# Patient Record
Sex: FEMALE | Race: WHITE | Employment: FULL TIME | URBAN - METROPOLITAN AREA
[De-identification: names, ages, dates, MRNs, and addresses within clinical notes are randomized per-mention and may not be internally consistent; named-entity substitution may affect disease eponyms.]

---

## 2017-07-12 ENCOUNTER — OFFICE VISIT (OUTPATIENT)
Dept: INTERNAL MEDICINE CLINIC | Age: 32
End: 2017-07-12

## 2017-07-12 VITALS
SYSTOLIC BLOOD PRESSURE: 114 MMHG | HEIGHT: 65 IN | RESPIRATION RATE: 16 BRPM | WEIGHT: 125 LBS | DIASTOLIC BLOOD PRESSURE: 66 MMHG | HEART RATE: 76 BPM | BODY MASS INDEX: 20.83 KG/M2 | TEMPERATURE: 98.2 F | OXYGEN SATURATION: 98 %

## 2017-07-12 DIAGNOSIS — D22.9 NUMEROUS MOLES: ICD-10-CM

## 2017-07-12 DIAGNOSIS — Z23 NEED FOR TDAP VACCINATION: ICD-10-CM

## 2017-07-12 DIAGNOSIS — Z00.00 WELL WOMAN EXAM (NO GYNECOLOGICAL EXAM): Primary | ICD-10-CM

## 2017-07-12 RX ORDER — NORETHINDRONE ACETATE AND ETHINYL ESTRADIOL AND FERROUS FUMARATE 1MG-20(21)
1 KIT ORAL DAILY
COMMUNITY
Start: 2017-07-05 | End: 2020-01-08

## 2017-07-12 NOTE — PROGRESS NOTES
HISTORY OF PRESENT ILLNESS  Lee Nichols is a 36 y.o. female. HPI  New Patient  Lee Nichols is a new patient. Prior care:  She has not had a PCP. Has been seeing her gynecologist for primary care. Her gynecologist  Dr. Trinity Flores. Records have been requested. ER Visits/ Hospitalizations [id number]: None     Health Maintenance  Breast Cancer screening: Aunt in remission  Colorectal Cancer screening: Not indicated. Cervical Cancer screening: Up to date, completed by gynecologist.    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 RVA x 1 yr   Review of Systems   Constitutional: Negative for chills, fever and weight loss. HENT: Negative for congestion and sore throat. Eyes: Negative for blurred vision and double vision. Respiratory: Negative for cough and shortness of breath. Cardiovascular: Negative for chest pain, orthopnea and leg swelling. Gastrointestinal: Negative for abdominal pain, blood in stool, constipation, diarrhea, heartburn, nausea and vomiting. Genitourinary: Negative for frequency and urgency. Musculoskeletal: Negative for joint pain and myalgias. Neurological: Negative for dizziness, tremors, weakness and headaches. Endo/Heme/Allergies: Does not bruise/bleed easily. Psychiatric/Behavioral: Negative for depression and suicidal ideas. There are no active problems to display for this patient. Current Outpatient Prescriptions   Medication Sig Dispense Refill    JUNEL FE 1/20, 28, 1 mg-20 mcg (21)/75 mg (7) tab Take 1 Tab by mouth daily.          No Known Allergies   Visit Vitals    /66 (BP 1 Location: Right arm, BP Patient Position: Sitting)    Pulse 76    Temp 98.2 °F (36.8 °C) (Oral)    Resp 16    Ht 5' 4.57\" (1.64 m)    Wt 125 lb (56.7 kg)    SpO2 98%    BMI 21.08 kg/m2       Physical Exam   Constitutional: She is oriented to person, place, and time. She appears well-developed and well-nourished. HENT:   Right Ear: External ear normal.   Left Ear: External ear normal.   Mouth/Throat: Oropharynx is clear and moist. No oropharyngeal exudate. Eyes: Conjunctivae are normal. No scleral icterus. Neck: Normal range of motion. Neck supple. No thyromegaly present. Cardiovascular: Normal rate, regular rhythm and normal heart sounds. Exam reveals no gallop and no friction rub. No murmur heard. Pulmonary/Chest: Effort normal and breath sounds normal. No respiratory distress. She has no wheezes. She has no rales. She exhibits no tenderness. Abdominal: Soft. Bowel sounds are normal. She exhibits no distension. There is no tenderness. There is no rebound and no guarding. Genitourinary:   Genitourinary Comments: Up to date, completed by gynecologist.       Musculoskeletal: Normal range of motion. She exhibits no edema or tenderness. Neurological: She is alert and oriented to person, place, and time. Skin:        Psychiatric: She has a normal mood and affect. ASSESSMENT and PLAN  Jaspreet Hua was seen today for establish care. Diagnoses and all orders for this visit:    Well woman exam (no gynecological exam)- Health Maintenance reviewed - tetanus booster given. -     CBC WITH AUTOMATED DIFF  -     LIPID PANEL  -     METABOLIC PANEL, COMPREHENSIVE  -     THYROID PANEL  -     TSH 3RD GENERATION  -     VITAMIN D, 25 HYDROXY  -     TETANUS, DIPHTHERIA TOXOIDS AND ACELLULAR PERTUSSIS VACCINE (TDAP), IN INDIVIDS. >=7, IM  -     MA IMMUNIZ ADMIN,1 SINGLE/COMB VAC/TOXOID    Numerous moles  -     REFERRAL TO DERMATOLOGY    Need for Tdap vaccination  -     TETANUS, DIPHTHERIA TOXOIDS AND ACELLULAR PERTUSSIS VACCINE (TDAP), IN INDIVIDS. >=7, IM  -     MA IMMUNIZ ADMIN,1 SINGLE/COMB VAC/TOXOID      Follow-up Disposition:  Return in about 1 year (around 7/12/2018) for Physical - 30 minute appointment.     Medication risks/benefits/costs/interactions/alternatives discussed with patient. Kerline Alejo  was given an after visit summary which includes diagnoses, current medications, & vitals. she expressed understanding with the diagnosis and plan.

## 2017-07-12 NOTE — PATIENT INSTRUCTIONS
It was a pleasure to meet you! As discussed:    Health Maintenance   I have ordered your age appropriate labs please complete them. You will need to fast 10-12hrs before your appointment. Great job on [employer] and exercising! Remember goal for exercise is 150minutes of moderate exercise a week and diet goal is to eat 50% fruits and vegetables with minimal sugar, fat and oil daily. See health.gov or choosemyplate.gov for more details. Your cervical cancer and breast cancer screening will be completed by your ob/ gyn as scheduled. Mole evaluation  Please schedule an appointment with dermatology (list provided) to evaluate your moles. You received the tetanus/pertussis vaccine today. Please see vaccine handout for more information and let us know if you have any reactions or concerns after the vaccination. Well Visit, Ages 25 to 48: Care Instructions  Your Care Instructions  Physical exams can help you stay healthy. Your doctor has checked your overall health and may have suggested ways to take good care of yourself. He or she also may have recommended tests. At home, you can help prevent illness with healthy eating, regular exercise, and other steps. Follow-up care is a key part of your treatment and safety. Be sure to make and go to all appointments, and call your doctor if you are having problems. It's also a good idea to know your test results and keep a list of the medicines you take. How can you care for yourself at home? · Reach and stay at a healthy weight. This will lower your risk for many problems, such as obesity, diabetes, heart disease, and high blood pressure. · Get at least 30 minutes of physical activity on most days of the week. Walking is a good choice. You also may want to do other activities, such as running, swimming, cycling, or playing tennis or team sports. Discuss any changes in your exercise program with your doctor. · Do not smoke or allow others to smoke around you. If you need help quitting, talk to your doctor about stop-smoking programs and medicines. These can increase your chances of quitting for good. · Talk to your doctor about whether you have any risk factors for sexually transmitted infections (STIs). Having one sex partner (who does not have STIs and does not have sex with anyone else) is a good way to avoid these infections. · Use birth control if you do not want to have children at this time. Talk with your doctor about the choices available and what might be best for you. · Protect your skin from too much sun. When you're outdoors from 10 a.m. to 4 p.m., stay in the shade or cover up with clothing and a hat with a wide brim. Wear sunglasses that block UV rays. Even when it's cloudy, put broad-spectrum sunscreen (SPF 30 or higher) on any exposed skin. · See a dentist one or two times a year for checkups and to have your teeth cleaned. · Wear a seat belt in the car. · Drink alcohol in moderation, if at all. That means no more than 2 drinks a day for men and 1 drink a day for women. Follow your doctor's advice about when to have certain tests. These tests can spot problems early. For everyone  · Cholesterol. Have the fat (cholesterol) in your blood tested after age 21. Your doctor will tell you how often to have this done based on your age, family history, or other things that can increase your risk for heart disease. · Blood pressure. Have your blood pressure checked during a routine doctor visit. Your doctor will tell you how often to check your blood pressure based on your age, your blood pressure results, and other factors. · Vision. Talk with your doctor about how often to have a glaucoma test.  · Diabetes. Ask your doctor whether you should have tests for diabetes. · Colon cancer. Have a test for colon cancer at age 48. You may have one of several tests. If you are younger than 48, you may need a test earlier if you have any risk factors.  Risk factors include whether you already had a precancerous polyp removed from your colon or whether your parent, brother, sister, or child has had colon cancer. For women  · Breast exam and mammogram. Talk to your doctor about when you should have a clinical breast exam and a mammogram. Medical experts differ on whether and how often women under 50 should have these tests. Your doctor can help you decide what is right for you. · Pap test and pelvic exam. Begin Pap tests at age 24. A Pap test is the best way to find cervical cancer. The test often is part of a pelvic exam. Ask how often to have this test.  · Tests for sexually transmitted infections (STIs). Ask whether you should have tests for STIs. You may be at risk if you have sex with more than one person, especially if your partners do not wear condoms. For men  · Tests for sexually transmitted infections (STIs). Ask whether you should have tests for STIs. You may be at risk if you have sex with more than one person, especially if you do not wear a condom. · Testicular cancer exam. Ask your doctor whether you should check your testicles regularly. · Prostate exam. Talk to your doctor about whether you should have a blood test (called a PSA test) for prostate cancer. Experts differ on whether and when men should have this test. Some experts suggest it if you are older than 39 and are -American or have a father or brother who got prostate cancer when he was younger than 72. When should you call for help? Watch closely for changes in your health, and be sure to contact your doctor if you have any problems or symptoms that concern you. Where can you learn more? Go to http://sherlyn-ga.info/. Enter P072 in the search box to learn more about \"Well Visit, Ages 25 to 48: Care Instructions. \"  Current as of: July 19, 2016  Content Version: 11.3  © 1413-4866 [employer].  Care instructions adapted under license by [employer] (which disclaims liability or warranty for this information). If you have questions about a medical condition or this instruction, always ask your healthcare professional. [address] any warranty or liability for your use of this information.

## 2017-07-12 NOTE — MR AVS SNAPSHOT
Visit Information Date & Time Provider Department Dept. Phone Encounter #  
 7/12/2017  3:00 PM Ronit Palomares MD [address] [id number] Internal Medicine [phone] [id number] Follow-up Instructions Return in about 1 year (around 7/12/2018) for Physical - 30 minute appointment. Upcoming Health Maintenance Date Due DTaP/Tdap/Td series (1 - Tdap) 12/29/2008 PAP AKA CERVICAL CYTOLOGY 12/29/2008 INFLUENZA AGE 9 TO ADULT 8/1/2017 Allergies as of 7/12/2017  Review Complete On: 7/12/2017 By: Ronit Palomares MD  
 No Known Allergies Current Immunizations  Never Reviewed Name Date Tdap  Incomplete Not reviewed this visit You Were Diagnosed With   
  
 Codes Comments Well woman exam (no gynecological exam)    -  Primary ICD-10-CM: Z00.00 ICD-9-CM: V70.0 Numerous moles     ICD-10-CM: D22.9 ICD-9-CM: 216.9 Vitals BP Pulse Temp Resp Height(growth percentile) Weight(growth percentile) 114/66 (BP 1 Location: Right arm, BP Patient Position: Sitting) 76 98.2 °F (36.8 °C) (Oral) 16 5' 4.57\" (1.64 m) 125 lb (56.7 kg) LMP SpO2 BMI Smoking Status 06/26/2017 98% 21.08 kg/m2 Never Smoker Vitals History BMI and BSA Data Body Mass Index Body Surface Area 21.08 kg/m 2 1.61 m 2 Preferred Pharmacy Pharmacy Name Phone [id number] [place], [address] [facility] [phone] Your Updated Medication List  
  
   
This list is accurate as of: 7/12/17  3:49 PM.  Always use your most recent med list.  
  
  
  
  
 Nalini Sy FE 1/20 (28) 1 mg-20 mcg (21)/75 mg (7) Tab Generic drug:  norethindrone-ethinyl estradiol Take 1 Tab by mouth daily. We Performed the Following CBC WITH AUTOMATED DIFF [30837 CPT(R)] LIPID PANEL [14522 CPT(R)] METABOLIC PANEL, COMPREHENSIVE [91980 CPT(R)] [place] IMMUNIZ ADMIN,1 SINGLE/COMB VAC/TOXOID [id number] CPT(R)] REFERRAL TO DERMATOLOGY [REF19 Custom] TETANUS, DIPHTHERIA TOXOIDS AND ACELLULAR PERTUSSIS VACCINE (TDAP), IN INDIVIDS. >=7, IM [id number] CPT(R)] THYROID PANEL [id number] CPT(R)] TSH 3RD GENERATION [13774 CPT(R)] VITAMIN D, 25 HYDROXY [id number] CPT(R)] Follow-up Instructions Return in about 1 year (around 7/12/2018) for Physical - 30 minute appointment. Referral Information Referral ID Referred By Referred To  
  
 [id number] ROSIE TAFOYA MD   
   [address] [facility] [address] Phone: [phone] Fax: [phone] Visits Status Start Date End Date 1 New Request 7/12/17 7/12/18 If your referral has a status of pending review or denied, additional information will be sent to support the outcome of this decision. Patient Instructions It was a pleasure to meet you! As discussed: 
 
Health Maintenance I have ordered your age appropriate labs please complete them. You will need to fast 10-12hrs before your appointment. Great job on [employer] and exercising! Remember goal for exercise is 150minutes of moderate exercise a week and diet goal is to eat 50% fruits and vegetables with minimal sugar, fat and oil daily. See health.gov or choosemyplate.gov for more details. Your cervical cancer and breast cancer screening will be completed by your ob/ gyn as scheduled. Mole evaluation Please schedule an appointment with dermatology (list provided) to evaluate your moles. Well Visit, Ages 25 to 48: Care Instructions Your Care Instructions Physical exams can help you stay healthy. Your doctor has checked your overall health and may have suggested ways to take good care of yourself. He or she also may have recommended tests. At home, you can help prevent illness with healthy eating, regular exercise, and other steps. Follow-up care is a key part of your treatment and safety. Be sure to make and go to all appointments, and call your doctor if you are having problems. It's also a good idea to know your test results and keep a list of the medicines you take. How can you care for yourself at home? · Reach and stay at a healthy weight. This will lower your risk for many problems, such as obesity, diabetes, heart disease, and high blood pressure. · Get at least 30 minutes of physical activity on most days of the week. Walking is a good choice. You also may want to do other activities, such as running, swimming, cycling, or playing tennis or team sports. Discuss any changes in your exercise program with your doctor. · Do not smoke or allow others to smoke around you. If you need help quitting, talk to your doctor about stop-smoking programs and medicines. These can increase your chances of quitting for good. · Talk to your doctor about whether you have any risk factors for sexually transmitted infections (STIs). Having one sex partner (who does not have STIs and does not have sex with anyone else) is a good way to avoid these infections. · Use birth control if you do not want to have children at this time. Talk with your doctor about the choices available and what might be best for you. · Protect your skin from too much sun. When you're outdoors from 10 a.m. to 4 p.m., stay in the shade or cover up with clothing and a hat with a wide brim. Wear sunglasses that block UV rays. Even when it's cloudy, put broad-spectrum sunscreen (SPF 30 or higher) on any exposed skin. · See a dentist one or two times a year for checkups and to have your teeth cleaned. · Wear a seat belt in the car. · Drink alcohol in moderation, if at all. That means no more than 2 drinks a day for men and 1 drink a day for women. Follow your doctor's advice about when to have certain tests. These tests can spot problems early. For everyone · Cholesterol. Have the fat (cholesterol) in your blood tested after age 21. Your doctor will tell you how often to have this done based on your age, family history, or other things that can increase your risk for heart disease. · Blood pressure. Have your blood pressure checked during a routine doctor visit. Your doctor will tell you how often to check your blood pressure based on your age, your blood pressure results, and other factors. · Vision. Talk with your doctor about how often to have a glaucoma test. 
· Diabetes. Ask your doctor whether you should have tests for diabetes. · Colon cancer. Have a test for colon cancer at age 48. You may have one of several tests. If you are younger than 48, you may need a test earlier if you have any risk factors. Risk factors include whether you already had a precancerous polyp removed from your colon or whether your parent, brother, sister, or child has had colon cancer. For women · Breast exam and mammogram. Talk to your doctor about when you should have a clinical breast exam and a mammogram. Medical experts differ on whether and how often women under 50 should have these tests. Your doctor can help you decide what is right for you. · Pap test and pelvic exam. Begin Pap tests at age 24. A Pap test is the best way to find cervical cancer. The test often is part of a pelvic exam. Ask how often to have this test. 
· Tests for sexually transmitted infections (STIs). Ask whether you should have tests for STIs. You may be at risk if you have sex with more than one person, especially if your partners do not wear condoms. For men · Tests for sexually transmitted infections (STIs). Ask whether you should have tests for STIs. You may be at risk if you have sex with more than one person, especially if you do not wear a condom. · Testicular cancer exam. Ask your doctor whether you should check your testicles regularly. · Prostate exam. Talk to your doctor about whether you should have a blood test (called a PSA test) for prostate cancer. Experts differ on whether and when men should have this test. Some experts suggest it if you are older than 39 and are -American or have a father or brother who got prostate cancer when he was younger than 72. When should you call for help? Watch closely for changes in your health, and be sure to contact your doctor if you have any problems or symptoms that concern you. Where can you learn more? Go to http://sherlyn-ga.info/. Enter P072 in the search box to learn more about \"Well Visit, Ages 25 to 48: Care Instructions. \" Current as of: July 19, 2016 Content Version: 11.3 © 8969-6131 [employer]. Care instructions adapted under license by [employer] (which disclaims liability or warranty for this information). If you have questions about a medical condition or this instruction, always ask your healthcare professional. [place] [address] any warranty or liability for your use of this information. Introducing [facility]! Isa Schmidt introduces [employer] patient portal. Now you can access parts of your medical record, email your doctor's office, and request medication refills online. 1. In your internet browser, go to [email or web address] 2. Click on the First Time User? Click Here link in the Sign In box. You will see the New Member Sign Up page. 3. Enter your [employer] Access Code exactly as it appears below. You will not need to use this code after youve completed the sign-up process. If you do not sign up before the expiration date, you must request a new code. · [employer] Access Code: [email or web address] Expires: 10/10/2017  3:04 PM 
 
4. Enter the last four digits of your Social Security Number (xxxx) and Date of Birth (mm/dd/yyyy) as indicated and click Submit.  You will be taken to the next sign-up page. 5. Create a Best[employer]endor ID. This will be your [employer] login ID and cannot be changed, so think of one that is secure and easy to remember. 6. Create a [employer] password. You can change your password at any time. 7. Enter your Password Reset Question and Answer. This can be used at a later time if you forget your password. 8. Enter your e-mail address. You will receive e-mail notification when new information is available in [address] 9. Click Sign Up. You can now view and download portions of your medical record. 10. Click the Download Summary menu link to download a portable copy of your medical information. If you have questions, please visit the Frequently Asked Questions section of the [employer] website. Remember, [employer] is NOT to be used for urgent needs. For medical emergencies, dial 911. Now available from your iPhone and Android! Please provide this summary of care documentation to your next provider. Your primary care clinician is listed as Ruby Gilbert. If you have any questions after today's visit, please call [phone].

## 2017-07-12 NOTE — PROGRESS NOTES
Chief Complaint   Patient presents with   Pauline Orona [facility]     1. Have you been to the ER, urgent care clinic since your last visit? Hospitalized since your last visit? Yes When: 2 weeks ago [facility] Reason for visit: ear infecton    2. Have you seen or consulted any other health care providers outside of the [address] [place] [address] since your last visit? Include any pap smears or colon screening.  No

## 2018-02-16 ENCOUNTER — OFFICE VISIT (OUTPATIENT)
Dept: INTERNAL MEDICINE CLINIC | Age: 33
End: 2018-02-16

## 2018-02-16 VITALS
HEIGHT: 65 IN | SYSTOLIC BLOOD PRESSURE: 108 MMHG | RESPIRATION RATE: 17 BRPM | TEMPERATURE: 100.1 F | HEART RATE: 117 BPM | DIASTOLIC BLOOD PRESSURE: 60 MMHG | OXYGEN SATURATION: 99 % | BODY MASS INDEX: 21.16 KG/M2 | WEIGHT: 127 LBS

## 2018-02-16 DIAGNOSIS — R68.89 FLU-LIKE SYMPTOMS: Primary | ICD-10-CM

## 2018-02-16 DIAGNOSIS — J11.1 INFLUENZA: ICD-10-CM

## 2018-02-16 LAB
FLUAV+FLUBV AG NOSE QL IA.RAPID: NEGATIVE POS/NEG
FLUAV+FLUBV AG NOSE QL IA.RAPID: NEGATIVE POS/NEG
VALID INTERNAL CONTROL?: YES

## 2018-02-16 RX ORDER — OSELTAMIVIR PHOSPHATE 75 MG/1
75 CAPSULE ORAL 2 TIMES DAILY
Qty: 10 CAP | Refills: 0 | Status: SHIPPED | OUTPATIENT
Start: 2018-02-16 | End: 2018-02-21

## 2018-02-16 NOTE — PROGRESS NOTES
Eric Briceño is a 29 y.o. female who present complaining of flu-like symptoms: fevers, chills, myalgias, congestion, sore throat and cough since last night. Denies dyspnea or wheezing.

## 2018-02-16 NOTE — PROGRESS NOTES
Subjective:   Cornelio Marie is a 29 y.o. female who present complaining of flu-like symptoms: fevers, chills, myalgias, congestion, sore throat and cough for 1 days. She denies dyspnea or wheezing. Smoking status: non-smoker. Flu vaccine status: not vaccinated this season. Relevant PMH: No pertinent additional PMH. Review of Systems  A comprehensive review of systems was negative except for that written in the HPI. There are no active problems to display for this patient. Current Outpatient Prescriptions   Medication Sig Dispense Refill    oseltamivir (TAMIFLU) 75 mg capsule Take 1 Cap by mouth two (2) times a day for 5 days. 10 Cap 0    JUNEL FE 1/20, 28, 1 mg-20 mcg (21)/75 mg (7) tab Take 1 Tab by mouth daily. Objective:     Visit Vitals    /60 (BP 1 Location: Right arm, BP Patient Position: Sitting)    Pulse (!) 117    Temp (!) 101 °F  (Oral)    Resp 17    Ht 5' 4.57\" (1.64 m)    Wt 127 lb (57.6 kg)    LMP 02/09/2018    SpO2 99%    BMI 21.42 kg/m2       Appears moderately ill but not toxic; temperature as noted in vitals. Ears normal.   Throat and pharynx normal.    Neck supple. No adenopathy in the neck. Sinuses non tender. The chest is clear. Rapid flu is positive    Assessment/Plan:   Influenza very likely from clinical presentation and seasonal pattern  Considerations for specific influenza anti-viral therapy: Positive rapid flu in the office  Symptomatic therapy suggested: rest, increase fluids and call prn if symptoms persist or worsen. Call or return to clinic prn if these symptoms worsen or fail to improve as anticipated. Diagnoses and all orders for this visit:    1. Flu-like symptoms  -     AMB POC CORBIN INFLUENZA A/B TEST    2. Influenza  -     oseltamivir (TAMIFLU) 75 mg capsule; Take 1 Cap by mouth two (2) times a day for 5 days.

## 2018-06-15 ENCOUNTER — OFFICE VISIT (OUTPATIENT)
Dept: INTERNAL MEDICINE CLINIC | Age: 33
End: 2018-06-15

## 2018-06-15 VITALS
TEMPERATURE: 97.7 F | WEIGHT: 128 LBS | HEART RATE: 60 BPM | OXYGEN SATURATION: 99 % | DIASTOLIC BLOOD PRESSURE: 54 MMHG | SYSTOLIC BLOOD PRESSURE: 96 MMHG | HEIGHT: 65 IN | RESPIRATION RATE: 12 BRPM | BODY MASS INDEX: 21.33 KG/M2

## 2018-06-15 DIAGNOSIS — B36.0 TINEA VERSICOLOR: Primary | ICD-10-CM

## 2018-06-15 NOTE — PROGRESS NOTES
Arik Ahumada is a 29 y.o. female who was seen in clinic today (6/15/2018). Assessment & Plan:   Diagnoses and all orders for this visit:    1. Tinea versicolor- this is a recurrent problem, it is a new problem to me, symptoms are: worsened, differential dx reviewed with the patient, looks classic. Will treat with: meds below. See AVS, expected time course for resolution reviewed. -     Selenium Sulfide 2.25 % topical foam; Apply  to affected area daily. Follow-up Disposition:  Return if symptoms worsen or fail to improve. Subjective:   Pasquale Mota was seen today for Skin Problem    Dermatology Review  She is here to talk about rash. She noticed it 1 month ago, with gradually worsening since that time. Location: neck and chest.  Symptoms include erythema. She reports slotchy and dry after shower. It is worse in robert head. She has had this before. She used Selenium sulfide in the past w/ good relief. Treatment to date has included nothing currently. Prior to Admission medications    Medication Sig Start Date End Date Taking? Authorizing Provider   JUNEL FE 1/20, 30, 1 mg-20 mcg (21)/75 mg (7) tab Take 1 Tab by mouth daily. 7/5/17  Yes Historical Provider          No Known Allergies        Review of Systems   Constitutional: Negative for chills and fever. HENT: Negative for congestion. Respiratory: Negative for cough and shortness of breath. Cardiovascular: Negative for chest pain. Skin: Positive for rash. Objective:   Physical Exam   Cardiovascular: Normal heart sounds. Bradycardia present. No murmur heard. Pulmonary/Chest: Effort normal and breath sounds normal. She has no wheezes. She has no rales. Skin: Rash noted. Neck and upper chest white hypopigmented areas and some papular pink areas.          Visit Vitals    BP 96/54    Pulse 60    Temp 97.7 °F (36.5 °C) (Oral)    Resp 12    Ht 5' 4.57\" (1.64 m)    Wt 128 lb (58.1 kg)    LMP 06/13/2018    SpO2 99%    BMI 21.58 kg/m2         Disclaimer:  Advised her to call back or return to office if symptoms worsen/change/persist.  Discussed expected course/resolution/complications of diagnosis in detail with patient. Medication risks/benefits/costs/interactions/alternatives discussed with patient. She was given an after visit summary which includes diagnoses, current medications, & vitals. She expressed understanding with the diagnosis and plan. Aspects of this note may have been generated using voice recognition software. Despite editing, there may be some syntax errors.        Kenji Morris MD

## 2018-06-15 NOTE — MR AVS SNAPSHOT
[address] [facility] [address] [facility] 
[phone] Patient: Sierra Merlin MRN: [id number] :1985 Visit Information Date & Time Provider Department Dept. Phone Encounter #  
 6/15/2018 12:30 PM Wendy Brantley, [address] [facility] Internal Medicine [phone] [id number] Follow-up Instructions Return if symptoms worsen or fail to improve. Your Appointments 2018  9:00 AM  
PHYSICAL with Evaristo Albright MD  
[facility] Internal Medicine [facility]) Appt Note: CPE - (1yr) [address] [facility] [address] [facility] [address]  
[phone] [address] [place] [place] [address] Upcoming Health Maintenance Date Due Influenza Age 5 to Adult 2018 PAP AKA CERVICAL CYTOLOGY 7/3/2020 DTaP/Tdap/Td series (2 - Td) 2027 Allergies as of 6/15/2018  Review Complete On: 6/15/2018 By: Wendy Brantley MD  
 No Known Allergies Current Immunizations  Reviewed on 6/15/2018 Name Date Tdap 2017 Reviewed by Sylvester Gallegos RN on 6/15/2018 at 12:28 PM  
You Were Diagnosed With   
  
 Codes Comments Tinea versicolor    -  Primary ICD-10-CM: B36.0 ICD-9-CM: 111.0 Vitals BP Pulse Temp Resp Height(growth percentile) Weight(growth percentile) 96/54 60 97.7 °F (36.5 °C) (Oral) 12 5' 4.57\" (1.64 m) 128 lb (58.1 kg) LMP SpO2 BMI OB Status Smoking Status 2018 99% 21.58 kg/m2 Having regular periods Never Smoker BMI and BSA Data Body Mass Index Body Surface Area  
 21.58 kg/m 2 1.63 m 2 Preferred Pharmacy Pharmacy Name Phone [id number] [address] [phone] Your Updated Medication List  
  
   
This list is accurate as of 6/15/18 12:40 PM.  Always use your most recent med list.  
  
  
  
  
 Eric Yvette FE  (30) 1 mg-20 mcg (21)/75 mg (7) Tab Generic drug:  norethindrone-ethinyl estradiol Take 1 Tab by mouth daily. Selenium Sulfide 2.25 % topical foam  
Apply  to affected area daily. Prescriptions Sent to Pharmacy Refills Selenium Sulfide 2.25 % topical foam 0 Sig: Apply  to affected area daily. Class: Normal  
 Pharmacy: [id number] [place], [address] [facility] #: [phone] Route: Topical  
  
Follow-up Instructions Return if symptoms worsen or fail to improve. Patient Instructions Tinea Versicolor: Care Instructions Your Care Instructions Tinea versicolor is a skin infection caused by a yeast (fungus). It causes many small spots, usually on the chest and back. The spotted skin can be flaky or scaly. The spots do not tan in the sun, so they are lighter than the skin around them. Some spots may be darker than the skin around them. The yeast that causes tinea versicolor normally lives on your skin. But it becomes a problem only when warmth and humidity allow the yeast to grow rapidly and increase in number. Some people are more likely to get tinea versicolor. It does not spread from person to person. Tinea versicolor usually gets better as you age. You can treat tinea versicolor with cream or ointment that kills the yeast. You may need pills to kill the fungus if the spots cover a lot of your body. Although treatment kills the yeast quickly, your skin may not return to normal for months after treatment. You can get this condition again after treatment. Follow-up care is a key part of your treatment and safety. Be sure to make and go to all appointments, and call your doctor if you are having problems. It's also a good idea to know your test results and keep a list of the medicines you take. How can you care for yourself at home? · Follow the directions for use of creams, shampoos, or solutions. You will probably need to use them for 1 to 2 weeks.  If your skin gets irritated, stop using the product, and call your doctor. · To prevent tinea versicolor, use a cream, shampoo, or solution one time a month. Your doctor may prescribe pills to prevent the spots from returning. · Dry off well after bathing. Keep your skin clean and dry. · Always wear sunscreen on exposed skin. Make sure to use a broad-spectrum sunscreen that has a sun protection factor (SPF) of 30 or higher. Use it every day, even when it is cloudy. · If you keep getting tinea versicolor, wash your clothes in very hot water to kill the yeast. 
When should you call for help? Call your doctor now or seek immediate medical care if: 
? · You have signs of infection such as: 
¨ Pain, warmth, or swelling in your skin. ¨ Red streaks near a wound in your skin. ¨ Pus coming from a wound in your skin. ¨ A fever. ? Watch closely for changes in your health, and be sure to contact your doctor if: 
? · Your skin condition does not improve in 2 weeks. ? · You do not get better as expected. Where can you learn more? Go to http://sherlyn-ga.info/. Enter N205 in the search box to learn more about \"Tinea Versicolor: Care Instructions. \" Current as of: October 13, 2016 Content Version: 11.4 © 5158-5802 [employer]). Care instructions adapted under license by Teburu (which disclaims liability or warranty for this information). If you have questions about a medical condition or this instruction, always ask your healthcare professional. [place] [address] any warranty or liability for your use of this information. Introducing [facility]! [facility] introduces Lucky Ant patient portal. Now you can access parts of your medical record, email your doctor's office, and request medication refills online. 1. In your internet browser, go to [email or web address] [email or web address]/[employer] 2. Click on the First Time User? Click Here link in the Sign In box. You will see the New Member Sign Up page. 3. Enter your Shopflick Access Code exactly as it appears below. You will not need to use this code after youve completed the sign-up process. If you do not sign up before the expiration date, you must request a new code. · Shopflick Access Code: [email or web address] Expires: 9/13/2018 12:24 PM 
 
4. Enter the last four digits of your Social Security Number (xxxx) and Date of Birth (mm/dd/yyyy) as indicated and click Submit. You will be taken to the next sign-up page. 5. Create a Shopflick ID. This will be your Shopflick login ID and cannot be changed, so think of one that is secure and easy to remember. 6. Create a Shopflick password. You can change your password at any time. 7. Enter your Password Reset Question and Answer. This can be used at a later time if you forget your password. 8. Enter your e-mail address. You will receive e-mail notification when new information is available in [address] 9. Click Sign Up. You can now view and download portions of your medical record. 10. Click the Download Summary menu link to download a portable copy of your medical information. If you have questions, please visit the Frequently Asked Questions section of the Shopflick website. Remember, Shopflick is NOT to be used for urgent needs. For medical emergencies, dial 911. Now available from your iPhone and Android! Please provide this summary of care documentation to your next provider. Your primary care clinician is listed as Shai Ruff. If you have any questions after today's visit, please call [phone].

## 2018-06-15 NOTE — PATIENT INSTRUCTIONS
Tinea Versicolor: Care Instructions  Your Care Instructions  Tinea versicolor is a skin infection caused by a yeast (fungus). It causes many small spots, usually on the chest and back. The spotted skin can be flaky or scaly. The spots do not tan in the sun, so they are lighter than the skin around them. Some spots may be darker than the skin around them. The yeast that causes tinea versicolor normally lives on your skin. But it becomes a problem only when warmth and humidity allow the yeast to grow rapidly and increase in number. Some people are more likely to get tinea versicolor. It does not spread from person to person. Tinea versicolor usually gets better as you age. You can treat tinea versicolor with cream or ointment that kills the yeast. You may need pills to kill the fungus if the spots cover a lot of your body. Although treatment kills the yeast quickly, your skin may not return to normal for months after treatment. You can get this condition again after treatment. Follow-up care is a key part of your treatment and safety. Be sure to make and go to all appointments, and call your doctor if you are having problems. It's also a good idea to know your test results and keep a list of the medicines you take. How can you care for yourself at home? · Follow the directions for use of creams, shampoos, or solutions. You will probably need to use them for 1 to 2 weeks. If your skin gets irritated, stop using the product, and call your doctor. · To prevent tinea versicolor, use a cream, shampoo, or solution one time a month. Your doctor may prescribe pills to prevent the spots from returning. · Dry off well after bathing. Keep your skin clean and dry. · Always wear sunscreen on exposed skin. Make sure to use a broad-spectrum sunscreen that has a sun protection factor (SPF) of 30 or higher. Use it every day, even when it is cloudy.   · If you keep getting tinea versicolor, wash your clothes in very hot water to kill the yeast.  When should you call for help? Call your doctor now or seek immediate medical care if:  ? · You have signs of infection such as:  ¨ Pain, warmth, or swelling in your skin. ¨ Red streaks near a wound in your skin. ¨ Pus coming from a wound in your skin. ¨ A fever. ? Watch closely for changes in your health, and be sure to contact your doctor if:  ? · Your skin condition does not improve in 2 weeks. ? · You do not get better as expected. Where can you learn more? Go to http://sherlyn-ga.info/. Enter O655 in the search box to learn more about \"Tinea Versicolor: Care Instructions. \"  Current as of: October 13, 2016  Content Version: 11.4  © 7876-8705 [employer]. Care instructions adapted under license by AdsIt (which disclaims liability or warranty for this information). If you have questions about a medical condition or this instruction, always ask your healthcare professional. [address] any warranty or liability for your use of this information.

## 2018-06-22 RX ORDER — KETOCONAZOLE 20 MG/G
CREAM TOPICAL DAILY
Qty: 60 G | Refills: 0 | Status: SHIPPED | OUTPATIENT
Start: 2018-06-22 | End: 2020-01-08

## 2018-07-11 ENCOUNTER — OFFICE VISIT (OUTPATIENT)
Dept: INTERNAL MEDICINE CLINIC | Age: 33
End: 2018-07-11

## 2018-07-11 VITALS
RESPIRATION RATE: 12 BRPM | OXYGEN SATURATION: 99 % | HEART RATE: 68 BPM | DIASTOLIC BLOOD PRESSURE: 66 MMHG | SYSTOLIC BLOOD PRESSURE: 120 MMHG | BODY MASS INDEX: 20.93 KG/M2 | WEIGHT: 125.6 LBS | HEIGHT: 65 IN | TEMPERATURE: 98.2 F

## 2018-07-11 DIAGNOSIS — B36.0 TINEA VERSICOLOR: ICD-10-CM

## 2018-07-11 DIAGNOSIS — Z00.00 WELL WOMAN EXAM (NO GYNECOLOGICAL EXAM): Primary | ICD-10-CM

## 2018-07-11 DIAGNOSIS — D22.9 MULTIPLE NEVI: ICD-10-CM

## 2018-07-11 NOTE — PATIENT INSTRUCTIONS
It was a pleasure to see you! As discussed: 
 
Health Maintenance I have ordered your age appropriate labs please complete them. You will need to fast 10-12hrs before your appointment. Great job on your  exercising! Remember goal for exercise is 150minutes of moderate exercise a week and diet goal is to eat 50% fruits and vegetables with minimal sugar, fat and oil daily. See health.gov or choosemyplate.gov for more details. How to break your sugar addiction in 10 days via [facility]  
https://health. [facility].org/2015/05/break-your-sugar-addiction-in-10-days-infographic/ Your cervical cancer and breast cancer screening will be completed by your ob/ gyn as scheduled. Well Visit, Ages 25 to 48: Care Instructions Your Care Instructions Physical exams can help you stay healthy. Your doctor has checked your overall health and may have suggested ways to take good care of yourself. He or she also may have recommended tests. At home, you can help prevent illness with healthy eating, regular exercise, and other steps. Follow-up care is a key part of your treatment and safety. Be sure to make and go to all appointments, and call your doctor if you are having problems. It's also a good idea to know your test results and keep a list of the medicines you take. How can you care for yourself at home? · Reach and stay at a healthy weight. This will lower your risk for many problems, such as obesity, diabetes, heart disease, and high blood pressure. · Get at least 30 minutes of physical activity on most days of the week. Walking is a good choice. You also may want to do other activities, such as running, swimming, cycling, or playing tennis or team sports. Discuss any changes in your exercise program with your doctor. · Do not smoke or allow others to smoke around you. If you need help quitting, talk to your doctor about stop-smoking programs and medicines.  These can increase your chances of quitting for good. · Talk to your doctor about whether you have any risk factors for sexually transmitted infections (STIs). Having one sex partner (who does not have STIs and does not have sex with anyone else) is a good way to avoid these infections. · Use birth control if you do not want to have children at this time. Talk with your doctor about the choices available and what might be best for you. · Protect your skin from too much sun. When you're outdoors from 10 a.m. to 4 p.m., stay in the shade or cover up with clothing and a hat with a wide brim. Wear sunglasses that block UV rays. Even when it's cloudy, put broad-spectrum sunscreen (SPF 30 or higher) on any exposed skin. · See a dentist one or two times a year for checkups and to have your teeth cleaned. · Wear a seat belt in the car. · Drink alcohol in moderation, if at all. That means no more than 2 drinks a day for men and 1 drink a day for women. Follow your doctor's advice about when to have certain tests. These tests can spot problems early. For everyone · Cholesterol. Have the fat (cholesterol) in your blood tested after age 21. Your doctor will tell you how often to have this done based on your age, family history, or other things that can increase your risk for heart disease. · Blood pressure. Have your blood pressure checked during a routine doctor visit. Your doctor will tell you how often to check your blood pressure based on your age, your blood pressure results, and other factors. · Vision. Talk with your doctor about how often to have a glaucoma test. 
· Diabetes. Ask your doctor whether you should have tests for diabetes. · Colon cancer. Have a test for colon cancer at age 48. You may have one of several tests. If you are younger than 48, you may need a test earlier if you have any risk factors.  Risk factors include whether you already had a precancerous polyp removed from your colon or whether your parent, brother, sister, or child has had colon cancer. For women · Breast exam and mammogram. Talk to your doctor about when you should have a clinical breast exam and a mammogram. Medical experts differ on whether and how often women under 50 should have these tests. Your doctor can help you decide what is right for you. · Pap test and pelvic exam. Begin Pap tests at age 24. A Pap test is the best way to find cervical cancer. The test often is part of a pelvic exam. Ask how often to have this test. 
· Tests for sexually transmitted infections (STIs). Ask whether you should have tests for STIs. You may be at risk if you have sex with more than one person, especially if your partners do not wear condoms. For men · Tests for sexually transmitted infections (STIs). Ask whether you should have tests for STIs. You may be at risk if you have sex with more than one person, especially if you do not wear a condom. · Testicular cancer exam. Ask your doctor whether you should check your testicles regularly. · Prostate exam. Talk to your doctor about whether you should have a blood test (called a PSA test) for prostate cancer. Experts differ on whether and when men should have this test. Some experts suggest it if you are older than 39 and are -American or have a father or brother who got prostate cancer when he was younger than 72. When should you call for help? Watch closely for changes in your health, and be sure to contact your doctor if you have any problems or symptoms that concern you. Where can you learn more? Go to http://sherlyn-ga.info/. Enter P072 in the search box to learn more about \"Well Visit, Ages 25 to 48: Care Instructions. \" Current as of: May 16, 2017 Content Version: 11.7 © 8818-7847 Healthwise, Incorporated. Care instructions adapted under license by Mir[employer] (which disclaims liability or warranty for this information).  If you have questions about a medical condition or this instruction, always ask your healthcare professional. [place] [address] any warranty or liability for your use of this information.

## 2018-07-11 NOTE — PROGRESS NOTES
HISTORY OF PRESENT ILLNESS Lexi Valdivia is a 29 y.o. female. HPI Health Maintenance Topic Date Due  Influenza Age 5 to Adult  08/01/2018  PAP AKA CERVICAL CYTOLOGY  07/03/2020  
 DTaP/Tdap/Td series (2 - Td) 07/12/2027 Tinea Versicolor Dx by  Dr. Jen Soto  In June. Could not afford rx. Using Selium Blue intermittently. Rash stable. Cardiovascular Review: 
The patient has no known cardiovascular conditions. Diet and Lifestyle: exercises regularly, nonsmoker, alcohol intake <7 glassse/ week , high sugar intake Home BP Monitoring: is not measured at home. Pertinent ROS: no TIA's, no chest pain on exertion, no dyspnea on exertion, no swelling of ankles. SHx: Sister  6/30/18 [facility] There are no active problems to display for this patient. Current Outpatient Prescriptions Medication Sig Dispense Refill  JUNEL FE 1/20, 30, 1 mg-20 mcg (21)/75 mg (7) tab Take 1 Tab by mouth daily.  ketoconazole (NIZORAL) 2 % topical cream Apply  to affected area daily. 60 g 0 No Known Allergies Visit Vitals  /66 (BP 1 Location: Right arm, BP Patient Position: Sitting)  Pulse 68  Temp 98.2 °F (36.8 °C) (Oral)  Resp 12  Ht 5' 5.24\" (1.657 m)  Wt 125 lb 9.6 oz (57 kg)  SpO2 99%  BMI 20.75 kg/m2 Physical Exam  
Constitutional: She is oriented to person, place, and time. She appears well-developed and well-nourished. HENT:  
Right Ear: External ear normal.  
Left Ear: External ear normal.  
Mouth/Throat: Oropharynx is clear and moist. No oropharyngeal exudate. Eyes: Conjunctivae are normal. No scleral icterus. Neck: Normal range of motion. Neck supple. No thyromegaly present. Cardiovascular: Normal rate, regular rhythm and normal heart sounds. Exam reveals no gallop and no friction rub. No murmur heard. Pulmonary/Chest: Effort normal and breath sounds normal. No respiratory distress. She has no wheezes. She has no rales.  She exhibits no tenderness. Abdominal: Soft. Bowel sounds are normal. She exhibits no distension. There is no tenderness. There is no rebound and no guarding. Genitourinary:  
Genitourinary Comments: Up to date, completed by gynecologist.   
  
Musculoskeletal: Normal range of motion. She exhibits no edema or tenderness. Neurological: She is alert and oriented to person, place, and time. Skin:  
 
  
 
 
ASSESSMENT and PLAN Diagnoses and all orders for this visit: 
 
1. Well woman exam (no gynecological exam)- Age appropriate testing ordered Pap UTD  
-     CBC WITH AUTOMATED DIFF 
-     LIPID PANEL 
-     METABOLIC PANEL, COMPREHENSIVE 
-     THYROID PANEL 
-     TSH 3RD GENERATION 
-     VITAMIN D, 25 HYDROXY 2. Tinea versicolor- reminded to use shampoo as advised. 3. Multiple nevi- dermatology referral given. Follow-up Disposition: 
Return in about 1 year (around 7/11/2019) for Physical - 30 minute appointment. Medication risks/benefits/costs/interactions/alternatives discussed with patient. [place]  was given an after visit summary which includes diagnoses, current medications, & vitals. she expressed understanding with the diagnosis and plan.

## 2018-07-11 NOTE — MR AVS SNAPSHOT
[address] [place] [address] 57 
[phone] Patient: Gena Brittle MRN: [id number] :1985 Visit Information Date & Time Provider Department Dept. Phone Encounter #  
 2018  9:00 AM Danielle Salmeron MD [facility] [phone] [id number] Follow-up Instructions Return in about 1 year (around 2019) for Physical - 30 minute appointment. Upcoming Health Maintenance Date Due Influenza Age 5 to Adult 2018 PAP AKA CERVICAL CYTOLOGY 7/3/2020 DTaP/Tdap/Td series (2 - Td) 2027 Allergies as of 2018  Review Complete On: 2018 By: Danielle Salmeron MD  
 No Known Allergies Current Immunizations  Reviewed on 6/15/2018 Name Date Tdap 2017 Not reviewed this visit You Were Diagnosed With   
  
 Codes Comments Well woman exam (no gynecological exam)    -  Primary ICD-10-CM: Z00.00 ICD-9-CM: V70.0 Tinea versicolor     ICD-10-CM: B36.0 ICD-9-CM: 111.0 Multiple nevi     ICD-10-CM: D22.9 ICD-9-CM: 216.9 Vitals BP Pulse Temp Resp Height(growth percentile) Weight(growth percentile) 120/66 (BP 1 Location: Right arm, BP Patient Position: Sitting) 68 98.2 °F (36.8 °C) (Oral) 12 5' 5.24\" (1.657 m) 125 lb 9.6 oz (57 kg) LMP SpO2 BMI OB Status Smoking Status 2018 99% 20.75 kg/m2 Having regular periods Never Smoker Vitals History BMI and BSA Data Body Mass Index Body Surface Area 20.75 kg/m 2 1.62 m 2 Preferred Pharmacy Pharmacy Name Phone [id number] [phone] Your Updated Medication List  
  
   
This list is accurate as of 18  9:56 AM.  Always use your most recent med list.  
  
  
  
  
 Daisy Pardo FE  (28) 1 mg-20 mcg (21)/75 mg (7) Tab Generic drug:  norethindrone-ethinyl estradiol Take 1 Tab by mouth daily. ketoconazole 2 % topical cream  
Commonly known as:  NIZORAL Apply  to affected area daily. We Performed the Following CBC WITH AUTOMATED DIFF [67597 CPT(R)] LIPID PANEL [34793 CPT(R)] METABOLIC PANEL, COMPREHENSIVE [28206 CPT(R)] THYROID PANEL [id number] CPT(R)] TSH 3RD GENERATION [58921 CPT(R)] VITAMIN D, 25 HYDROXY [id number] CPT(R)] Follow-up Instructions Return in about 1 year (around 7/11/2019) for Physical - 30 minute appointment. Patient Instructions It was a pleasure to see you! As discussed: 
 
Health Maintenance I have ordered your age appropriate labs please complete them. You will need to fast 10-12hrs before your appointment. Great job on your  exercising! Remember goal for exercise is 150minutes of moderate exercise a week and diet goal is to eat 50% fruits and vegetables with minimal sugar, fat and oil daily. See health.gov or Helpmycashplate.gov for more details. How to break your sugar addiction in 10 days via [facility]  
[email or web address] [email or web address]/break-your-sugar-addiction-in-10-days-infographic/ Your cervical cancer and breast cancer screening will be completed by your ob/ gyn as scheduled. Well Visit, Ages 25 to 48: Care Instructions Your Care Instructions Physical exams can help you stay healthy. Your doctor has checked your overall health and may have suggested ways to take good care of yourself. He or she also may have recommended tests. At home, you can help prevent illness with healthy eating, regular exercise, and other steps. Follow-up care is a key part of your treatment and safety. Be sure to make and go to all appointments, and call your doctor if you are having problems. It's also a good idea to know your test results and keep a list of the medicines you take. How can you care for yourself at home? · Reach and stay at a healthy weight. This will lower your risk for many problems, such as obesity, diabetes, heart disease, and high blood pressure. · Get at least 30 minutes of physical activity on most days of the week. Walking is a good choice. You also may want to do other activities, such as running, swimming, cycling, or playing tennis or team sports. Discuss any changes in your exercise program with your doctor. · Do not smoke or allow others to smoke around you. If you need help quitting, talk to your doctor about stop-smoking programs and medicines. These can increase your chances of quitting for good. · Talk to your doctor about whether you have any risk factors for sexually transmitted infections (STIs). Having one sex partner (who does not have STIs and does not have sex with anyone else) is a good way to avoid these infections. · Use birth control if you do not want to have children at this time. Talk with your doctor about the choices available and what might be best for you. · Protect your skin from too much sun. When you're outdoors from 10 a.m. to 4 p.m., stay in the shade or cover up with clothing and a hat with a wide brim. Wear sunglasses that block UV rays. Even when it's cloudy, put broad-spectrum sunscreen (SPF 30 or higher) on any exposed skin. · See a dentist one or two times a year for checkups and to have your teeth cleaned. · Wear a seat belt in the car. · Drink alcohol in moderation, if at all. That means no more than 2 drinks a day for men and 1 drink a day for women. Follow your doctor's advice about when to have certain tests. These tests can spot problems early. For everyone · Cholesterol. Have the fat (cholesterol) in your blood tested after age 21. Your doctor will tell you how often to have this done based on your age, family history, or other things that can increase your risk for heart disease. · Blood pressure. Have your blood pressure checked during a routine doctor visit. Your doctor will tell you how often to check your blood pressure based on your age, your blood pressure results, and other factors. · Vision. Talk with your doctor about how often to have a glaucoma test. 
· Diabetes. Ask your doctor whether you should have tests for diabetes. · Colon cancer. Have a test for colon cancer at age 48. You may have one of several tests. If you are younger than 48, you may need a test earlier if you have any risk factors. Risk factors include whether you already had a precancerous polyp removed from your colon or whether your parent, brother, sister, or child has had colon cancer. For women · Breast exam and mammogram. Talk to your doctor about when you should have a clinical breast exam and a mammogram. Medical experts differ on whether and how often women under 50 should have these tests. Your doctor can help you decide what is right for you. · Pap test and pelvic exam. Begin Pap tests at age 24. A Pap test is the best way to find cervical cancer. The test often is part of a pelvic exam. Ask how often to have this test. 
· Tests for sexually transmitted infections (STIs). Ask whether you should have tests for STIs. You may be at risk if you have sex with more than one person, especially if your partners do not wear condoms. For men · Tests for sexually transmitted infections (STIs). Ask whether you should have tests for STIs. You may be at risk if you have sex with more than one person, especially if you do not wear a condom. · Testicular cancer exam. Ask your doctor whether you should check your testicles regularly. · Prostate exam. Talk to your doctor about whether you should have a blood test (called a PSA test) for prostate cancer.  Experts differ on whether and when men should have this test. Some experts suggest it if you are older than 39 and are -American or have a father or brother who got prostate cancer when he was younger than 72. When should you call for help? Watch closely for changes in your health, and be sure to contact your doctor if you have any problems or symptoms that concern you. Where can you learn more? Go to http://sherlyn-ga.info/. Enter P072 in the search box to learn more about \"Well Visit, Ages 25 to 48: Care Instructions. \" Current as of: May 16, 2017 Content Version: 11.7 © 1447-2016 Healthwise, Incorporated. Care instructions adapted under license by [employer] [place] (which disclaims liability or warranty for this information). If you have questions about a medical condition or this instruction, always ask your healthcare professional. [address] any warranty or liability for your use of this information. Introducing [facility]! [facility] introduces [employer] patient portal. Now you can access parts of your medical record, email your doctor's office, and request medication refills online. 1. In your internet browser, go to [email or web address]. [employer] 2. Click on the First Time User? Click Here link in the Sign In box. You will see the New Member Sign Up page. 3. Enter your [employer] Access Code exactly as it appears below. You will not need to use this code after youve completed the sign-up process. If you do not sign up before the expiration date, you must request a new code. · [employer] Access Code: [email or web address] Expires: 9/13/2018 12:24 PM 
 
4. Enter the last four digits of your Social Security Number (xxxx) and Date of Birth (mm/dd/yyyy) as indicated and click Submit. You will be taken to the next sign-up page. 5. Create a [employer] ID. This will be your [employer] login ID and cannot be changed, so think of one that is secure and easy to remember. 6. Create a my4oneone password. You can change your password at any time. 7. Enter your Password Reset Question and Answer. This can be used at a later time if you forget your password. 8. Enter your e-mail address. You will receive e-mail notification when new information is available in [address] 9. Click Sign Up. You can now view and download portions of your medical record. 10. Click the Download Summary menu link to download a portable copy of your medical information. If you have questions, please visit the Frequently Asked Questions section of the my[employer]oneone website. Remember, [employer]oneone is NOT to be used for urgent needs. For medical emergencies, dial 911. Now available from your iPhone and Android! Please provide this summary of care documentation to your next provider. Your primary care clinician is listed as Yue Christensen. If you have any questions after today's visit, please call [phone].

## 2018-07-11 NOTE — PROGRESS NOTES
Chief Complaint   Patient presents with    Complete Physical     1. Have you been to the ER, urgent care clinic since your last visit? Hospitalized since your last visit? No    2. Have you seen or consulted any other health care providers outside of the [address] [place] [address] since your last visit? Include any pap smears or colon screening.  No

## 2019-06-04 LAB
ANTIBODY SCREEN, EXTERNAL: NEGATIVE
CHLAMYDIA, EXTERNAL: NEGATIVE
HBSAG, EXTERNAL: NEGATIVE
HGB, EXTERNAL: 13.6
HIV, EXTERNAL: NORMAL
N. GONORRHEA, EXTERNAL: NEGATIVE
RPR, EXTERNAL: NEGATIVE
RUBELLA, EXTERNAL: <0.9
TYPE, ABO & RH, EXTERNAL: NORMAL

## 2019-10-29 LAB — T. PALLIDUM, EXTERNAL: NEGATIVE

## 2019-12-18 LAB — GRBS, EXTERNAL: NEGATIVE

## 2020-01-06 ENCOUNTER — HOSPITAL ENCOUNTER (INPATIENT)
Age: 35
LOS: 2 days | Discharge: HOME | End: 2020-01-08
Attending: OBSTETRICS & GYNECOLOGY | Admitting: OBSTETRICS & GYNECOLOGY
Payer: COMMERCIAL

## 2020-01-06 ENCOUNTER — ANESTHESIA (OUTPATIENT)
Dept: LABOR AND DELIVERY | Age: 35
End: 2020-01-06
Payer: COMMERCIAL

## 2020-01-06 ENCOUNTER — ANESTHESIA EVENT (OUTPATIENT)
Dept: LABOR AND DELIVERY | Age: 35
End: 2020-01-06
Payer: COMMERCIAL

## 2020-01-06 PROBLEM — Z37.9 NORMAL LABOR: Status: ACTIVE | Noted: 2020-01-06

## 2020-01-06 LAB
A1 MICROGLOB PLACENTAL VAG QL: POSITIVE
CONTROL LINE PRESENT?: NORMAL
ERYTHROCYTE [DISTWIDTH] IN BLOOD BY AUTOMATED COUNT: 12.8 % (ref 11.5–14.5)
EXPIRATION DATE: NORMAL
HCT VFR BLD AUTO: 40 % (ref 35–47)
HGB BLD-MCNC: 13.3 G/DL (ref 11.5–16)
INTERNAL NEGATIVE CONTROL: NORMAL
KIT LOT NO.: NORMAL
MCH RBC QN AUTO: 32.7 PG (ref 26–34)
MCHC RBC AUTO-ENTMCNC: 33.3 G/DL (ref 30–36.5)
MCV RBC AUTO: 98.3 FL (ref 80–99)
NRBC # BLD: 0 K/UL (ref 0–0.01)
NRBC BLD-RTO: 0 PER 100 WBC
PLATELET # BLD AUTO: 207 K/UL (ref 150–400)
PMV BLD AUTO: 11.5 FL (ref 8.9–12.9)
RBC # BLD AUTO: 4.07 M/UL (ref 3.8–5.2)
WBC # BLD AUTO: 18.4 K/UL (ref 3.6–11)

## 2020-01-06 PROCEDURE — 85027 COMPLETE CBC AUTOMATED: CPT

## 2020-01-06 PROCEDURE — 74011250637 HC RX REV CODE- 250/637: Performed by: OBSTETRICS & GYNECOLOGY

## 2020-01-06 PROCEDURE — 36415 COLL VENOUS BLD VENIPUNCTURE: CPT

## 2020-01-06 PROCEDURE — 74011250636 HC RX REV CODE- 250/636: Performed by: ANESTHESIOLOGY

## 2020-01-06 PROCEDURE — 75410000002 HC LABOR FEE PER 1 HR

## 2020-01-06 PROCEDURE — 4A1HXCZ MONITORING OF PRODUCTS OF CONCEPTION, CARDIAC RATE, EXTERNAL APPROACH: ICD-10-PCS | Performed by: OBSTETRICS & GYNECOLOGY

## 2020-01-06 PROCEDURE — 0KQM0ZZ REPAIR PERINEUM MUSCLE, OPEN APPROACH: ICD-10-PCS | Performed by: OBSTETRICS & GYNECOLOGY

## 2020-01-06 PROCEDURE — 75410000000 HC DELIVERY VAGINAL/SINGLE

## 2020-01-06 PROCEDURE — 84112 EVAL AMNIOTIC FLUID PROTEIN: CPT | Performed by: OBSTETRICS & GYNECOLOGY

## 2020-01-06 PROCEDURE — 00HU33Z INSERTION OF INFUSION DEVICE INTO SPINAL CANAL, PERCUTANEOUS APPROACH: ICD-10-PCS | Performed by: ANESTHESIOLOGY

## 2020-01-06 PROCEDURE — 74011000250 HC RX REV CODE- 250: Performed by: ANESTHESIOLOGY

## 2020-01-06 PROCEDURE — 76060000078 HC EPIDURAL ANESTHESIA

## 2020-01-06 PROCEDURE — 65410000002 HC RM PRIVATE OB

## 2020-01-06 PROCEDURE — 75410000003 HC RECOV DEL/VAG/CSECN EA 0.5 HR

## 2020-01-06 PROCEDURE — 74011250636 HC RX REV CODE- 250/636

## 2020-01-06 RX ORDER — ONDANSETRON 2 MG/ML
4 INJECTION INTRAMUSCULAR; INTRAVENOUS
Status: DISCONTINUED | OUTPATIENT
Start: 2020-01-06 | End: 2020-01-06

## 2020-01-06 RX ORDER — EPHEDRINE SULFATE/0.9% NACL/PF 50 MG/5 ML
12.5 SYRINGE (ML) INTRAVENOUS
Status: DISCONTINUED | OUTPATIENT
Start: 2020-01-06 | End: 2020-01-06

## 2020-01-06 RX ORDER — FENTANYL/BUPIVACAINE/NS/PF 2-1250MCG
1-16 PREFILLED PUMP RESERVOIR EPIDURAL CONTINUOUS
Status: DISCONTINUED | OUTPATIENT
Start: 2020-01-06 | End: 2020-01-06

## 2020-01-06 RX ORDER — NALOXONE HYDROCHLORIDE 0.4 MG/ML
0.4 INJECTION, SOLUTION INTRAMUSCULAR; INTRAVENOUS; SUBCUTANEOUS AS NEEDED
Status: DISCONTINUED | OUTPATIENT
Start: 2020-01-06 | End: 2020-01-08 | Stop reason: HOSPADM

## 2020-01-06 RX ORDER — OXYTOCIN/0.9 % SODIUM CHLORIDE 30/500 ML
PLASTIC BAG, INJECTION (ML) INTRAVENOUS
Status: COMPLETED
Start: 2020-01-06 | End: 2020-01-06

## 2020-01-06 RX ORDER — SIMETHICONE 80 MG
80 TABLET,CHEWABLE ORAL
Status: DISCONTINUED | OUTPATIENT
Start: 2020-01-06 | End: 2020-01-08 | Stop reason: HOSPADM

## 2020-01-06 RX ORDER — ACETAMINOPHEN 325 MG/1
650 TABLET ORAL
Status: DISCONTINUED | OUTPATIENT
Start: 2020-01-06 | End: 2020-01-08 | Stop reason: HOSPADM

## 2020-01-06 RX ORDER — IBUPROFEN 400 MG/1
800 TABLET ORAL EVERY 8 HOURS
Status: DISCONTINUED | OUTPATIENT
Start: 2020-01-06 | End: 2020-01-08 | Stop reason: HOSPADM

## 2020-01-06 RX ORDER — SODIUM CHLORIDE 0.9 % (FLUSH) 0.9 %
SYRINGE (ML) INJECTION
Status: COMPLETED
Start: 2020-01-06 | End: 2020-01-06

## 2020-01-06 RX ORDER — OXYTOCIN/RINGER'S LACTATE 20/1000 ML
125-500 PLASTIC BAG, INJECTION (ML) INTRAVENOUS ONCE
Status: ACTIVE | OUTPATIENT
Start: 2020-01-06 | End: 2020-01-07

## 2020-01-06 RX ORDER — FENTANYL CITRATE 50 UG/ML
100 INJECTION, SOLUTION INTRAMUSCULAR; INTRAVENOUS ONCE
Status: DISCONTINUED | OUTPATIENT
Start: 2020-01-06 | End: 2020-01-06

## 2020-01-06 RX ORDER — NALOXONE HYDROCHLORIDE 0.4 MG/ML
0.4 INJECTION, SOLUTION INTRAMUSCULAR; INTRAVENOUS; SUBCUTANEOUS AS NEEDED
Status: DISCONTINUED | OUTPATIENT
Start: 2020-01-06 | End: 2020-01-06

## 2020-01-06 RX ORDER — TERBUTALINE SULFATE 1 MG/ML
0.25 INJECTION SUBCUTANEOUS AS NEEDED
Status: DISCONTINUED | OUTPATIENT
Start: 2020-01-06 | End: 2020-01-06

## 2020-01-06 RX ORDER — FENTANYL CITRATE 50 UG/ML
INJECTION, SOLUTION INTRAMUSCULAR; INTRAVENOUS AS NEEDED
Status: DISCONTINUED | OUTPATIENT
Start: 2020-01-06 | End: 2020-01-06 | Stop reason: HOSPADM

## 2020-01-06 RX ORDER — ZOLPIDEM TARTRATE 5 MG/1
5 TABLET ORAL
Status: DISCONTINUED | OUTPATIENT
Start: 2020-01-06 | End: 2020-01-08 | Stop reason: HOSPADM

## 2020-01-06 RX ORDER — MISOPROSTOL 200 UG/1
800 TABLET ORAL ONCE
Status: COMPLETED | OUTPATIENT
Start: 2020-01-06 | End: 2020-01-06

## 2020-01-06 RX ORDER — BUTORPHANOL TARTRATE 1 MG/ML
1 INJECTION INTRAMUSCULAR; INTRAVENOUS
Status: DISCONTINUED | OUTPATIENT
Start: 2020-01-06 | End: 2020-01-06

## 2020-01-06 RX ORDER — SWAB
1 SWAB, NON-MEDICATED MISCELLANEOUS DAILY
Status: DISCONTINUED | OUTPATIENT
Start: 2020-01-07 | End: 2020-01-08 | Stop reason: HOSPADM

## 2020-01-06 RX ORDER — MISOPROSTOL 200 UG/1
TABLET ORAL
Status: DISCONTINUED
Start: 2020-01-06 | End: 2020-01-06

## 2020-01-06 RX ORDER — LIDOCAINE HYDROCHLORIDE AND EPINEPHRINE 15; 5 MG/ML; UG/ML
INJECTION, SOLUTION EPIDURAL AS NEEDED
Status: DISCONTINUED | OUTPATIENT
Start: 2020-01-06 | End: 2020-01-06 | Stop reason: HOSPADM

## 2020-01-06 RX ORDER — DIPHENHYDRAMINE HCL 25 MG
25 CAPSULE ORAL
Status: DISCONTINUED | OUTPATIENT
Start: 2020-01-06 | End: 2020-01-08 | Stop reason: HOSPADM

## 2020-01-06 RX ORDER — HYDROCORTISONE ACETATE PRAMOXINE HCL 2.5; 1 G/100G; G/100G
CREAM TOPICAL AS NEEDED
Status: DISCONTINUED | OUTPATIENT
Start: 2020-01-06 | End: 2020-01-08 | Stop reason: HOSPADM

## 2020-01-06 RX ORDER — ONDANSETRON 4 MG/1
4 TABLET, ORALLY DISINTEGRATING ORAL
Status: ACTIVE | OUTPATIENT
Start: 2020-01-06 | End: 2020-01-07

## 2020-01-06 RX ORDER — OXYCODONE AND ACETAMINOPHEN 5; 325 MG/1; MG/1
1 TABLET ORAL
Status: DISCONTINUED | OUTPATIENT
Start: 2020-01-06 | End: 2020-01-08 | Stop reason: HOSPADM

## 2020-01-06 RX ORDER — LIDOCAINE HYDROCHLORIDE 10 MG/ML
INJECTION INFILTRATION; PERINEURAL
Status: DISCONTINUED
Start: 2020-01-06 | End: 2020-01-06

## 2020-01-06 RX ORDER — DOCUSATE SODIUM 100 MG/1
100 CAPSULE, LIQUID FILLED ORAL
Status: DISCONTINUED | OUTPATIENT
Start: 2020-01-06 | End: 2020-01-08 | Stop reason: HOSPADM

## 2020-01-06 RX ORDER — DIPHENHYDRAMINE HYDROCHLORIDE 50 MG/ML
12.5 INJECTION, SOLUTION INTRAMUSCULAR; INTRAVENOUS
Status: DISCONTINUED | OUTPATIENT
Start: 2020-01-06 | End: 2020-01-06

## 2020-01-06 RX ORDER — BUPIVACAINE HYDROCHLORIDE 2.5 MG/ML
INJECTION, SOLUTION EPIDURAL; INFILTRATION; INTRACAUDAL AS NEEDED
Status: DISCONTINUED | OUTPATIENT
Start: 2020-01-06 | End: 2020-01-06 | Stop reason: HOSPADM

## 2020-01-06 RX ORDER — BUPIVACAINE HYDROCHLORIDE 5 MG/ML
30 INJECTION, SOLUTION EPIDURAL; INTRACAUDAL AS NEEDED
Status: DISCONTINUED | OUTPATIENT
Start: 2020-01-06 | End: 2020-01-06

## 2020-01-06 RX ADMIN — Medication 10 ML/HR: at 10:52

## 2020-01-06 RX ADMIN — IBUPROFEN 800 MG: 400 TABLET, FILM COATED ORAL at 22:47

## 2020-01-06 RX ADMIN — LIDOCAINE HYDROCHLORIDE,EPINEPHRINE BITARTRATE 3 ML: 15; .005 INJECTION, SOLUTION EPIDURAL; INFILTRATION; INTRACAUDAL; PERINEURAL at 10:45

## 2020-01-06 RX ADMIN — Medication 10 ML: at 07:36

## 2020-01-06 RX ADMIN — OXYTOCIN-SODIUM CHLORIDE 0.9% IV SOLN 30 UNIT/500ML 30000 MILLI-UNITS: 30-0.9/5 SOLUTION at 19:27

## 2020-01-06 RX ADMIN — BUPIVACAINE HYDROCHLORIDE 2 ML: 2.5 INJECTION, SOLUTION EPIDURAL; INFILTRATION; INTRACAUDAL; PERINEURAL at 10:46

## 2020-01-06 RX ADMIN — SODIUM CHLORIDE, SODIUM LACTATE, POTASSIUM CHLORIDE, AND CALCIUM CHLORIDE 1000 ML: 600; 310; 30; 20 INJECTION, SOLUTION INTRAVENOUS at 11:15

## 2020-01-06 RX ADMIN — MISOPROSTOL 800 MCG: 200 TABLET ORAL at 19:29

## 2020-01-06 RX ADMIN — LIDOCAINE HYDROCHLORIDE,EPINEPHRINE BITARTRATE 2 ML: 15; .005 INJECTION, SOLUTION EPIDURAL; INFILTRATION; INTRACAUDAL; PERINEURAL at 10:43

## 2020-01-06 RX ADMIN — FENTANYL CITRATE 100 MCG: 50 INJECTION, SOLUTION INTRAMUSCULAR; INTRAVENOUS at 10:44

## 2020-01-06 RX ADMIN — BUPIVACAINE HYDROCHLORIDE 2 ML: 2.5 INJECTION, SOLUTION EPIDURAL; INFILTRATION; INTRACAUDAL; PERINEURAL at 10:47

## 2020-01-06 NOTE — ANESTHESIA PREPROCEDURE EVALUATION
Relevant Problems   No relevant active problems       Anesthetic History   No history of anesthetic complications            Review of Systems / Medical History  Patient summary reviewed, nursing notes reviewed and pertinent labs reviewed    Pulmonary  Within defined limits                 Neuro/Psych   Within defined limits           Cardiovascular  Within defined limits                     GI/Hepatic/Renal  Within defined limits              Endo/Other  Within defined limits           Other Findings              Physical Exam    Airway  Mallampati: I  TM Distance: > 6 cm  Neck ROM: normal range of motion   Mouth opening: Normal     Cardiovascular  Regular rate and rhythm,  S1 and S2 normal,  no murmur, click, rub, or gallop             Dental  No notable dental hx       Pulmonary  Breath sounds clear to auscultation               Abdominal  GI exam deferred       Other Findings            Anesthetic Plan    ASA: 2  Anesthesia type: epidural          Induction: Intravenous  Anesthetic plan and risks discussed with: Patient

## 2020-01-06 NOTE — PROGRESS NOTES
Patient found to be C/C/+1 (anterior lip reduces easily). Will set up to push. Category 1 fetal heart tracing.     Brownearnestine Monson MD

## 2020-01-06 NOTE — PROGRESS NOTES
Doing well. Active FM. Feeling moderate contractions.     Visit Vitals  /79   Pulse 93   Temp 98.1 °F (36.7 °C)   Resp 14   Ht 5' 4\" (1.626 m)   Wt 78 kg (172 lb)   Breastfeeding No   BMI 29.52 kg/m²     Category 1 fetal heart tracing  Baseline 140, moderate variability, + accels, no decels  Contractions every 3-4 minutes    Cervix 5-9OX/92%/-0, cephalic, forebag  EFW 1.8-8#    G1 39 0/7 who presented with SROM which progressed to labor  Laboring well  Discussed option of rupture of forebag which she accepts, amniotomy of forebag which was tolerated well by mom and baby  GBS negative  Expectant management  Ok for intermittent monitoring  Ok for epidural if desired  Recheck in 2-4 hours or prn    Raza Benites MD

## 2020-01-06 NOTE — ANESTHESIA PROCEDURE NOTES
Epidural Block    Start time: 1/6/2020 10:32 AM  End time: 1/6/2020 10:47 AM  Performed by: Nyasia Suresh MD  Authorized by: Nyasia Suresh MD     Pre-Procedure  Indication: labor epidural    Preanesthetic Checklist: patient identified, risks and benefits discussed, anesthesia consent, site marked, patient being monitored, timeout performed and anesthesia consent    Timeout Time: 10:32        Epidural:   Patient position:  Left lateral decubitus  Prep region:  Lumbar  Prep: Chlorhexidine    Location:  L2-3    Needle and Epidural Catheter:   Needle Type:  Tuohy  Needle Gauge:  17 G  Injection Technique:  Loss of resistance using air  Attempts:  1  Catheter Size:  19 G  Events: no blood with aspiration, no cerebrospinal fluid with aspiration, no paresthesia and negative aspiration test    Test Dose:  Bupivacaine 0.25% and negative    Assessment:   Catheter Secured:  Tegaderm and tape  Insertion:  Uncomplicated  Patient tolerance:  Patient tolerated the procedure well with no immediate complications

## 2020-01-06 NOTE — H&P
History & Physical    Name: Margareth Houston MRN: [id number]  SSN: [id number]    YOB: 1985  Age: 30 y.o. Sex: female        Subjective: leaking fluid and contractions     Estimated Date of Delivery: 20  OB History    Para Term  AB Living   1             SAB TAB Ectopic Molar Multiple Live Births                    # Outcome Date GA Lbr Kentrell/2nd Weight Sex Delivery Anes PTL Lv   1 Current                Ms. Ron Taylor is a cody  at 39w0d who presents with leaking fluid since around 10 PM and contractions. Prenatal course has been uncomplicated. Uncomplicated health history. Please see prenatal records for details. Here with her  Vikki Watkins. Past Medical History:   Diagnosis Date    Abnormal Papanicolaou smear of cervix     F/U normal     Past Surgical History:   Procedure Laterality Date    HX OTHER SURGICAL      tendon repair on toe/right foot    HX WISDOM TEETH EXTRACTION       Social History     Occupational History    Not on file   Tobacco Use    Smoking status: Never Smoker    Smokeless tobacco: Never Used   Substance and Sexual Activity    Alcohol use: Not Currently    Drug use: Not Currently    Sexual activity: Yes     Birth control/protection: Pill     Family History   Problem Relation Age of Onset    Parkinson's Disease Father     Cancer Maternal Grandmother         Brain    Cancer Maternal Grandfather         pancreatic    Parkinson's Disease Paternal Grandmother        No Known Allergies  Prior to Admission medications    Medication Sig Start Date End Date Taking? Authorizing Provider   prenatal vit-calcium-iron-fa (PRENATAL PLUS WITH CALCIUM) 27 mg iron- 1 mg tab Take 1 Tab by mouth daily. Yes Provider, Historical   ketoconazole (NIZORAL) 2 % topical cream Apply  to affected area daily. 18   Gladys Ram MD   JUNE FE , 30, 1 mg-20 mcg (21)/75 mg (7) tab Take 1 Tab by mouth daily.  17   Provider, Historical        Review of Systems: A comprehensive review of systems was negative except for that written in the HPI. Objective:     Vitals:  Vitals:    01/06/20 0015 01/06/20 0024 01/06/20 0214   BP: 126/78     Pulse: 88     Resp: 16     Temp: 97.9 °F (36.6 °C)  97.5 °F (36.4 °C)   Weight:  78 kg (172 lb)    Height:  5' 4\" (1.626 m)         Physical Exam:  Patient without distress. Membranes:  Spontaneous Rupture of Membranes; Amniotic Fluid: clear fluid  Fetal Heart Rate: Reactive  Baseline: 130s per minute  Variability: moderate  Accelerations: yes  Decelerations: none  Per nurse exam she's 3 cm and amnisure is positive    Prenatal Labs:   Lab Results   Component Value Date/Time    Rubella, External <0.9 06/04/2019    GrBStrep, External Negative 12/18/2019    HBsAg, External Negative 06/04/2019    HIV, External Non-reactive 06/04/2019    RPR, External Negative 06/04/2019    Gonorrhea, External Negative 06/04/2019    Chlamydia, External Negative 06/04/2019    ABO,Rh A Positive 06/04/2019         Assessment/Plan:     Active Problems:    Normal labor (1/6/2020)         Plan: Admit for delivery. She would like minimal interventions but is open to epidural. She's still in early labor but her contractions are intensifying and getting closer together so we'll do expectant management. Recheck cervix in 4-6 hours or sooner as needed. All questions answered.

## 2020-01-06 NOTE — PROGRESS NOTES
1/6/2020 12:08 AM - Patient here to rule out labor. Contractions started tonight at 2000. Patient stated that they were about 5-10 min apart for 30-45 seconds. Feels leaking fluid periodically. Went away when she laid down but then started back up and were closer together so decided to call the doc. Patient on monitor and up moving around in the room. Nitrazine paper negative. Patient has PUPPS rash on stomach along midline. 0050 - SVE 2 to 3cm and 70% effaced. Amnisure performed and resulted positive. 0100 - IV initiated and saline locked, Labs sent. 56 - Dr Kiel Sommers at bedside to discuss plan of care with patient. Will continue to monitor progress of labor. Patient prefers no IV pain meds and is not planning to get an epidural at this time. Verbal order received to continue intermittent monitoring. 0215 - Patient on birthing ball. 0330 - Patient requesting to be checked, feeling pressure with each contraction. SVE 4/90.     0430 - patient ambulating in room on telemonitor    0530 - Patient very uncomfortable. Set up Nitrous and consent is signed. Patient wanted to wait to begin Nitrous but verbalized understanding to call me before trying it first    0615 - Patient wanted to try Nitrous. After three puffs, she didn't like it. Patient uncomfortable sitting or lying down and wants to continue to walk. Doesn't want IV pain meds. 0730 - Bedside shift change report given to ERIN Francis RN (oncoming nurse) by Aracelis Kim RN (offgoing nurse). Report included the following information SBAR.

## 2020-01-06 NOTE — PROGRESS NOTES
0188 Bedside report received from Rusty Goldstein at bedside. SVE 5-6/90/-1. Forebag ruptured. 7093 Patient up to bathroom. 0812 Patient placed on monitor. 0945 Patient requesting SVE. 6/C/+1.    1000 Patient requesting epidural. [facility] at bedside. 1115 Patient feeling pressure. Turned on right side. SVE 8/C/+1.    1230 Turned on left side. 1335 Straight cath'd for 500cc of urine. SVE ant lip/C/+2. Attempt to reduce lip. Placed in trendelenburg on right side. 26 Dr. Radha Goldstein at bedside. Start pushing. 1453 Pushing on right side. 1500 Pushing on left side. 1508 Pushing in semifowlers. 1559 Pushing on right side. [address] on left side. [address] in semifowlers. [id number] Dr. Radha Goldstein at bedside to assess pushing. 499 [address] [place] [address] Dr. Radha Goldstein at bedside to assess pushing. 200 Dr. Radha Goldstein at bedside for delivery. 190  of live male infant.      Bedside report given to Alonso Fortune RN

## 2020-01-06 NOTE — PROGRESS NOTES
Patient is now comfortable with epidural.  Notes active fetal movement. Continued leakage of clear fluid.     Visit Vitals  /53   Pulse 73   Temp 98.1 °F (36.7 °C)   Resp 14   Ht 5' 4\" (1.626 m)   Wt 78 kg (172 lb)   SpO2 99%   Breastfeeding No   BMI 29.52 kg/m²     Category 1 fetal heart tracing  Baseline 130 moderate variability, + accels, no decels  [place]: contractions every 2-3 minutes    Cervix: 5AG/05%/-7, cephalic, ruptured  EFW 1.5-6#    G1 39 0/7 elective IOL  Cont pitocin  Cont fetal monitoring  Recheck cervix in 2-4 hours  Anticipate vaginal delivery    Faustino Delgadillo MD

## 2020-01-07 PROCEDURE — 65410000002 HC RM PRIVATE OB

## 2020-01-07 PROCEDURE — 74011250637 HC RX REV CODE- 250/637: Performed by: OBSTETRICS & GYNECOLOGY

## 2020-01-07 RX ORDER — IBUPROFEN 800 MG/1
800 TABLET ORAL EVERY 8 HOURS
Qty: 50 TAB | Refills: 1 | Status: SHIPPED | OUTPATIENT
Start: 2020-01-07 | End: 2022-04-29

## 2020-01-07 RX ADMIN — IBUPROFEN 800 MG: 400 TABLET, FILM COATED ORAL at 09:33

## 2020-01-07 RX ADMIN — Medication 1 TABLET: at 08:35

## 2020-01-07 RX ADMIN — IBUPROFEN 800 MG: 400 TABLET, FILM COATED ORAL at 19:49

## 2020-01-07 NOTE — PROGRESS NOTES
1940 Bedside and Verbal shift change report given to BLAS Davalos RN by CARLOS Francis RN. Report included the following information SBAR, Intake/Output and MAR.     2145 Patient up to bedside commode. Patient states that she feels slightly lightheaded but that it passed quickly. Voided small amount. Small lochia. Lisseth care completed. Void complete. Check void due by 0345. Patient instructed to call for assistance getting out of bed two more time. Patient verbalized understanding. 2200 TRANSFER - OUT REPORT:    Verbal report given to KAYA Uribe RN on [address]  being transferred to [facility] for routine progression of care       Report consisted of patients Situation, Background, Assessment and   Recommendations(SBAR). Information from the following report(s) SBAR, Intake/Output and MAR was reviewed with the receiving nurse. Lines:   Peripheral IV 01/06/20 Anterior;Distal;Left Forearm (Active)   Site Assessment Clean, dry, & intact 1/6/2020  7:43 PM   Phlebitis Assessment 0 1/6/2020  7:43 PM   Infiltration Assessment 0 1/6/2020  7:43 PM   Dressing Status Clean, dry, & intact 1/6/2020  7:43 PM   Dressing Type Tape;Transparent 1/6/2020  7:43 PM   Hub Color/Line Status Infusing 1/6/2020  7:43 PM        Opportunity for questions and clarification was provided.       Patient transported with:   Registered Nurse

## 2020-01-07 NOTE — ROUTINE PROCESS
0800: Bedside and Verbal shift change report given to Alfredo Chong RN  (oncoming nurse) by Karissa Hunt RN (offgoing nurse). Report included the following information SBAR.

## 2020-01-07 NOTE — LACTATION NOTE
This note was copied from a baby's chart. Initial Lactation Consultation - Baby born vaginally yesterday evening to a  mom at 43 weeks gestation. Mom states baby has been latching and nursing but not staying latched for the whole feeding. I helped mom with a feeding this afternoon. I gave her some tips on positioning and we were able to get the baby latched on the right breast in the cross cradle. Baby was initially popping off the breast after a few sucks but he then began to stay latched for longer periods. We heard him swallow at the breast. We were able to get the baby latched quicker on the left breast. Mom was getting better at latching the baby. Feeding Plan: Mother will keep baby skin to skin as often as possible, feed on demand, respond to feeding cues, obtain latch, listen for audible swallowing, be aware of signs of oxytocin release/ cramping, thirst and sleepiness while breastfeeding. Mom will not limit the time the baby is at the breast. She will allow the baby to completely finish one breast and then offer the second breast at each feeding. She will call out as needed for assistance with latching.

## 2020-01-07 NOTE — L&D DELIVERY NOTE
Delivery Summary  Patient: Nash Roa             Circumcision:   desires  Additional Delivery Comments - Patient pushed for 4+ hours to deliver LBMI. Baby had extremely tight nuchal cord x 1 which was difficult to reduce but ultimately reduced well. Shoulder was delivered with ease under pubic symphysis. The baby was placed on maternal abdomen and due to color and effort being poor cord was clamped and cut and handed to  staff where CPAP was given to assist in transition. Ultimately the baby transitioned well and was handed back to mother for skin to skin on room air. The placenta delivered within 25 minutes and was intact. The uterus was initially boggy and 400mL of blood loss was noted though resolved with aggressive fundal massage and 800mcg of misoprostol per rectum. A second degree laceration was noted and repaired with 3-0 vicryl in a running fashion. Two bilateral labial skid marks were noted and were not repaired due to hemostatic. A second degree laceration was repaired with 3-0 vicryl in a running fashion. Hemostasis was excellent.       Deandre Dumont MD      Information for the patient's :  Barbie Weldon [[id number]]       Labor Events:    Labor: No    Steroids: None   Cervical Ripening Date/Time:       Cervical Ripening Type: None   Antibiotics During Labor: No   Rupture Identifier: Sac 1    Rupture Date/Time: 2020 12:00 PM   Rupture Type: SROM   Amniotic Fluid Volume: Scant    Amniotic Fluid Description: Clear    Amniotic Fluid Odor: None    Induction: None       Induction Date/Time:        Indications for Induction:      Augmentation: None   Augmentation Date/Time:      Indications for Augmentation:     Labor complications: None       Additional complications:        Delivery Events:  Indications For Episiotomy:     Episiotomy: None   Perineal Laceration(s): 2nd   Repaired: Yes   Periurethral Laceration Location:      Repaired:     Labial Laceration Location: bilateral   Repaired: Yes   Sulcal Laceration Location:     Repaired:     Vaginal Laceration Location:     Repaired:     Cervical Laceration Location:     Repaired:     Repair Suture: Vicryl 3-0   Number of Repair Packets:     Estimated Blood Loss (ml):  ml     Delivery Date: 2020    Delivery Time: 7:04 PM  Delivery Type: Vaginal, Spontaneous  Sex:  Male    Gestational Age: 36w0d   Delivery Clinician:  Tara Gamino  Living Status: Living   Delivery Location: L&D room 3          APGARS  One minute Five minutes Ten minutes   Skin color: 0   1   2     Heart rate: 1   1   2     Grimace: 0   1   1     Muscle tone: 0   1   1     Breathin   1   2     Totals: 1   5   8         Presentation: Vertex    Position:        Resuscitation Method:  C-PAP;PPV;Oxygen;Suctioning-bulb;Suctioning-deep     Meconium Stained: Terminal      Cord Information: 3 Vessels  Complications: Nuchal Cord Without Compressions  Cord around:    Delayed cord clamping?  Yes  Cord clamped date/time:2020  7:05 PM  Disposition of Cord Blood: Lab    Blood Gases Sent?: No    Placenta:  Date/Time: 2020  7:25 PM  Removal: Spontaneous      Appearance: Normal      Measurements:  Birth Weight: 3.5 kg      Birth Length: 50.8 cm      Head Circumference: 35 cm      Chest Circumference:       Abdominal Girth: 34 cm    Other Providers:   BETSY LAM;RUPERT GUTIERREZ;MYRANDA QUIÑONES;ELYSSA MCCURDY;;;;CHIARA QUIJANO;;;JOSE DAVID QUIJANO, Obstetrician;Primary Nurse;Primary  Nurse;Nicu Nurse;Neonatologist;Anesthesiologist;Crna;Nurse Practitioner;Midwife;Nursery Nurse;Respiratory Therapist           Cord pH:  none    Episiotomy: None   Laceration(s): 2nd     Estimated Blood Loss (ml): 400 mL    Labor Events  Method: None      Augmentation: None   Cervical Ripening:       None        Hospital Problems  Date Reviewed: 2018          Codes Class Noted POA    Normal labor ICD-10-CM: O80, Z37.9  ICD-9-CM: 112  2020 Unknown Operative Vaginal Delivery - none    Group B Strep:   Lab Results   Component Value Date/Time    GrBStrep, External Negative 2019     Information for the patient's :  Briannakellie Kevin [[id number]]   No results found for: ABORH, PCTABR, PCTDIG, BILI, ABORHEXT, ABORH    No results found for: APH, APCO2, APO2, AHCO3, ABEC, ABDC, O2ST, EPHV, PCO2V, PO2V, HCO3V, EBEV, EBDV, SITE, RSCOM

## 2020-01-07 NOTE — ROUTINE PROCESS
TRANSFER - IN REPORT:    Verbal report received from DEMETRIUS Gregory(name) on Svetlana Coats  being received from LD(unit) for routine progression of care      Report consisted of patients Situation, Background, Assessment and   Recommendations(SBAR). Information from the following report(s) SBAR was reviewed with the receiving nurse. Opportunity for questions and clarification was provided. Assessment completed upon patients arrival to unit and care assumed.

## 2020-01-07 NOTE — DISCHARGE SUMMARY
Obstetrical Discharge Summary     Name: Zohreh Rothman MRN: [id number]  SSN: [id number]    YOB: 1985  Age: 30 y.o. Sex: female      Admit Date: 2020    Discharge Date: 2020     Admitting Physician: Alyssa Hagen MD     Attending Physician:  Génesis Lew MD     Admission Diagnoses: Normal labor [O80, Z37.9]    Discharge Diagnoses:   Information for the patient's :  Chris Shaffer [[id number]]   Delivery of a 3.5 kg male infant via Vaginal, Spontaneous on 2020 at 7:04 PM  by Seng Kim. Apgars were 1  and 5 . Additional Diagnoses:   Hospital Problems  Date Reviewed: 2018          Codes Class Noted POA    Normal labor ICD-10-CM: O80, Z37.9  ICD-9-CM: 203  2020 Unknown             Lab Results   Component Value Date/Time    Rubella, External <0.9 2019    GrBStrep, External Negative 2019       Immunization(s):   Immunization History   Administered Date(s) Administered    Tdap 2017        Hospital Course: Normal hospital course following the delivery. Patient Instructions:   Current Discharge Medication List      START taking these medications    Details   ibuprofen (MOTRIN) 800 mg tablet Take 1 Tab by mouth every eight (8) hours. Qty: 50 Tab, Refills: 1         CONTINUE these medications which have NOT CHANGED    Details   prenatal vit-calcium-iron-fa (PRENATAL PLUS WITH CALCIUM) 27 mg iron- 1 mg tab Take 1 Tab by mouth daily. STOP taking these medications       ketoconazole (NIZORAL) 2 % topical cream Comments:   Reason for Stopping:         JUNEL FE 1/20, 28, 1 mg-20 mcg (21)/75 mg (7) tab Comments:   Reason for Stopping:               Please make followup appointment for 4-6 weeks  Pelvic rest for 6 weeks  Pain medication as prescribed. Use of contraception as discussed.     Condition: stable  Disposition: home    Follow-up Appointments   Procedures    FOLLOW UP VISIT Appointment in: [facility]     Standing Status:   Standing     Number of Occurrences:   1     Order Specific Question:   Appointment in     Answer:   6 Weeks        Signed By:  Elsy Rocha MD     January 7, 2020

## 2020-01-07 NOTE — PROGRESS NOTES
Post-Partum Day Number 1 Progress Note    Svetlana Coats     Assessment: Doing well, post partum day 1    Plan:  1. Continue routine postpartum and perineal care as well as maternal education. 2. The risks and benefits of the circumcision  procedure and anesthesia including: bleeding, infection, variability of cosmetic results were discussed at length with the mother. She is aware that future repeat procedures may be necessary. She gives informed consent to proceed as noted and her questions are answered. Information for the patient's :  Anastasiya Hahn [[id number]]   Vaginal, Spontaneous   Patient doing well without significant complaint. Voiding without difficulty, normal lochia. Breastfeeding Pedro Stein going well. Vitals:  Visit Vitals  /73 (BP 1 Location: Left arm, BP Patient Position: At rest)   Pulse 83   Temp 97.7 °F (36.5 °C)   Resp 16   Ht 5' 4\" (1.626 m)   Wt 78 kg (172 lb)   SpO2 99%   Breastfeeding Unknown   BMI 29.52 kg/m²     Temp (24hrs), Av.8 °F (37.1 °C), Min:97.7 °F (36.5 °C), Max:100.2 °F (37.9 °C)        Exam:   Patient without distress. Abdomen soft, fundus firm, nontender                Lower extremities are negative for asymmetric swelling, cords or tenderness. Labs:     Lab Results   Component Value Date/Time    WBC 18.4 (H) 2020 01:42 AM    HGB 13.3 2020 01:42 AM    HCT 40.0 2020 01:42 AM    PLATELET 145  01:42 AM    Hgb, External 13.6 2019       No results found for this or any previous visit (from the past 24 hour(s)).

## 2020-01-08 VITALS
SYSTOLIC BLOOD PRESSURE: 117 MMHG | OXYGEN SATURATION: 99 % | TEMPERATURE: 97.9 F | WEIGHT: 172 LBS | RESPIRATION RATE: 16 BRPM | HEART RATE: 92 BPM | DIASTOLIC BLOOD PRESSURE: 62 MMHG | BODY MASS INDEX: 29.37 KG/M2 | HEIGHT: 64 IN

## 2020-01-08 PROCEDURE — 74011250637 HC RX REV CODE- 250/637: Performed by: OBSTETRICS & GYNECOLOGY

## 2020-01-08 PROCEDURE — 74011250636 HC RX REV CODE- 250/636: Performed by: OBSTETRICS & GYNECOLOGY

## 2020-01-08 PROCEDURE — 90707 MMR VACCINE SC: CPT | Performed by: OBSTETRICS & GYNECOLOGY

## 2020-01-08 RX ADMIN — IBUPROFEN 800 MG: 400 TABLET, FILM COATED ORAL at 04:01

## 2020-01-08 RX ADMIN — MEASLES, MUMPS, AND RUBELLA VIRUS VACCINE LIVE 0.5 ML: 1000; 12500; 1000 INJECTION, POWDER, LYOPHILIZED, FOR SUSPENSION SUBCUTANEOUS at 09:24

## 2020-01-08 RX ADMIN — IBUPROFEN 800 MG: 400 TABLET, FILM COATED ORAL at 11:56

## 2020-01-08 RX ADMIN — Medication 1 TABLET: at 11:57

## 2020-01-08 NOTE — DISCHARGE INSTRUCTIONS
POSTPARTUM DISCHARGE INSTRUCTIONS       Name:  Lexi Valdivia  YOB: 1985  Admission Diagnosis:  Normal labor [O80, Z37.9]     Discharge Diagnosis:    Problem List as of 1/8/2020 Date Reviewed: 7/11/2018          Codes Class Noted - Resolved    Normal labor ICD-10-CM: O80, Z37.9  ICD-9-CM: 841  1/6/2020 - Present            Attending Physician:  Nenita Edward MD    Delivery Type:  Vaginal Childbirth with Episiotomy, Laceration or Tear: What To Expect At [address] [place] [address] body will slowly heal in the next few weeks. It is easy to get too tired and overwhelmed during the first weeks after your baby is born. Changes in your hormones can shift your mood without warning. You may find it hard to meet the extra demands on your energy and time. Take it easy on yourself. Follow-up care is a key part of your treatment and safety. Be sure to make and go to all appointments, and call your doctor if you are having problems. It's also a good idea to know your test results and keep a list of the medicines you take. How can you care for yourself at home? Vaginal Bleeding and Cramps  · After delivery, you will have a bloody discharge from the vagina. This will turn pink within a week and then white or yellow after about 10 days. It may last for 2 to 4 weeks or longer, until the uterus has healed. Use pads instead of tampons until you stop bleeding. · Do not worry if you pass some blood clots, as long as they are smaller than a golf ball. If you have a tear or stitches in your vaginal area, change the pad at least every 4 hours to prevent soreness and infection. · You may have cramps for the first few days after childbirth. These are normal and occur as the uterus shrinks to normal size. Take an over-the-counter pain medicine, such as acetaminophen (Tylenol), ibuprofen (Advil, Motrin), or naproxen (Aleve), for cramps. Read and follow all instructions on the label.  Do not take aspirin, because it can cause more bleeding. Do not take acetaminophen (Tylenol) and other acetaminophen containing medications (i.e. Percocet) at the same time. Episiotomy, Lacerations or Tears  · If you have stitches, they will dissolve on their own and do not need to be removed. · Put ice or a cold pack on your painful area for 10 to 20 minutes at a time, several times a day, for the first few days. Put a thin cloth between the ice and your skin. · Sit in a few inches of warm water (sitz bath) 3 times a day and after bowel movements. The warm water helps with pain and itching. If you do not have a tub, a warm shower might help. Breast fullness  · Your breasts may overfill (engorge) in the first few days after delivery. To help milk flow and to relieve pain, warm your breasts in the shower or by using warm, moist towels before nursing. · If you are not nursing, do not put warmth on your breasts or touch your breasts. Wear a tight bra or sports bra and use ice until the fullness goes away. This usually takes 2 to 3 days. · Put ice or a cold pack on your breast after nursing to reduce swelling and pain. Put a thin cloth between the ice and your skin. Activity  · Eat a balanced diet. Do not try to lose weight by cutting calories. Keep taking your prenatal vitamins, or take a multivitamin. · Get as much rest as you can. Try to take naps when your baby sleeps during the day. · Get some exercise every day. But do not do any heavy exercise until your doctor says it is okay. · Wait until you are healed (about 4 to 6 weeks) before you have sexual intercourse. Your doctor will tell you when it is okay to have sex. · Talk to your doctor about birth control. You can get pregnant even before your period returns. Also, you can get pregnant while you are breast-feeding. Mental Health  · Many women get the \"baby blues\" during the first few days after childbirth. You may lose sleep, feel irritable, and cry easily.  You may feel happy one minute and sad the next. Hormone changes are one cause of these emotional changes. Also, the demands of a new baby, along with visits from relatives or other family needs, add to a mother's stress. The \"baby blues\" often peak around the fourth day. Then they ease up in less than 2 weeks. · If your moodiness or anxiety lasts for more than 2 weeks, or if you feel like life is not worth living, you may have postpartum depression. This is different for each mother. Some mothers with serious depression may worry intensely about their infant's well-being. Others may feel distant from their child. Some mothers might even feel that they might harm their baby. A mother may have signs of paranoia, wondering if someone is watching her. · With all the changes in your life, you may not know if you are depressed. Pregnancy sometimes causes changes in how you feel that are similar to the symptoms of depression. · Symptoms of depression include:  · Feeling sad or hopeless and losing interest in daily activities. These are the most common symptoms of depression. · Sleeping too much or not enough. · Feeling tired. You may feel as if you have no energy. · Eating too much or too little. · POST[employer]UM [employer] (PSI) offers a Warm line; Chat with the Expert phone sessions; Information and Articles about Pregnancy and Postpartum Mood Disorders; Comprehensive List of Free Support Groups; Knowledgeable local coordinators who will offer support, information, and resources; Guide to Resources on [employer]; Calendar of events in the  mood disorders community; Latest News and Research; and [facility] [address] for [employer]. Remember - You are not alone; You are not to blame; With help, you will be well. 1-425-732-PPD(4003). WWW. POSTPARTUM. NET    · Writing or talking about death, such as writing suicide notes or talking about guns, knives, or pills.  Keep the numbers for these national suicide hotlines: 8-132-440-TALK (5-880.285.9218) and 4-306-MHYCVMS (1-898.686.3715). If you or someone you know talks about suicide or feeling hopeless, get help right away. Constipation and Hemorrhoids  Drink plenty of fluids, enough so that your urine is light yellow or clear like water. If you have kidney, heart, or liver disease and have to limit fluids, talk with your doctor before you increase the amount of fluids you drink. · Eat plenty of fiber each day. Have a bran muffin or bran cereal for breakfast, and try eating a piece of fruit for a mid-afternoon snack. · For painful, itchy hemorrhoids, put ice or a cold pack on the area several times a day for 10 minutes at a time. Follow this by putting a warm compress on the area for another 10 to 20 minutes or by sitting in a shallow, warm bath. When should you call for help? Call 911 anytime you think you may need emergency care. For example, call if:  · You are thinking of hurting yourself, your baby, or anyone else. · You passed out (lost consciousness). · You have symptoms of a blood clot in your lung (called a pulmonary embolism). These may include:  · Sudden chest pain. · Trouble breathing. · Coughing up blood. Call your doctor now or seek immediate medical care if:  · You have severe vaginal bleeding. · You are soaking through a pad each hour for 2 or more hours. · Your vaginal bleeding seems to be getting heavier or is still bright red 4 days after delivery. · You are dizzy or lightheaded, or you feel like you may faint. · You are vomiting or cannot keep fluids down. · You have a fever. · You have new or more belly pain. · You pass tissue (not just blood). · Your vaginal discharge smells bad. · Your belly feels tender or full and hard. · Your breasts are continuously painful or red. · You feel sad, anxious, or hopeless for more than a few days. · You have sudden, severe pain in your belly.   · You have symptoms of a blood clot in your leg (called a deep vein thrombosis), such as:  · Pain in your calf, back of the knee, thigh, or groin. · Redness and swelling in your leg or groin. · You have symptoms of preeclampsia, such as:  · Sudden swelling of your face, hands, or feet. · New vision problems (such as dimness or blurring). · A severe headache. · Your blood pressure is higher than it should be or rises suddenly. · You have new nausea or vomiting. Watch closely for changes in your health, and be sure to contact your doctor if you have any problems. Additional Information:  Not applicable    These are general instructions for a healthy lifestyle:    No smoking/ No tobacco products/ Avoid exposure to second hand smoke    Surgeon General's Warning:  Quitting smoking now greatly reduces serious risk to your health. Obesity, smoking, and sedentary lifestyle greatly increases your risk for illness    A healthy diet, regular physical exercise & weight monitoring are important for maintaining a healthy lifestyle    Recognize signs and symptoms of STROKE:    F-face looks uneven    A-arms unable to move or move unevenly    S-speech slurred or non-existent    T-time-call 911 as soon as signs and symptoms begin - DO NOT go       back to bed or wait to see if you get better - TIME IS BRAIN. I have had the opportunity to make my options or choices for discharge. I have received and understand these instructions.

## 2020-01-08 NOTE — LACTATION NOTE
This note was copied from a baby's chart. Baby nursing well and has improved throughout post partum stay, deep latch maintained, mother is comfortable, milk is in transition, baby feeding vigorously with rhythmic suck, swallow, breathe pattern, with audible swallowing, and evident milk transfer, both breasts offerd, baby is asleep following feeding. Baby is feeding on demand, voiding (4)  and stools (2)  present as appropriate since birth. Weight loss 5.7%. Breasts may become engorged when milk \"comes in\". How milk is made / normal phases of milk production, supply and demand discussed. Taught care of engorged breasts - frequent breastfeeding encouraged, warm compresses and breast massage ac. Then nurse the baby or pump. Apply cold compresses pc x 15 minutes a few times a day for swelling or discomfort. May need to do this care for a couple of days. Discussed prevention and treatment of mastitis.

## 2020-01-09 NOTE — PROGRESS NOTES
Post-Partum Day Number 2 Progress Note    Svetlana Coats     Assessment: Doing well, post partum day 2    Plan:   1. Discharge home today  2. Follow up in office in 6 weeks with Dr. Rosales Webber  3. Post partum activity advised, diet as tolerated      Information for the patient's :  Kofi Arce [[id number]]   Vaginal, Spontaneous   Patient doing well without significant complaint. Voiding without difficulty, normal lochia. Vitals:  Visit Vitals  /62 (BP 1 Location: Right arm, BP Patient Position: At rest)   Pulse 92   Temp 97.9 °F (36.6 °C)   Resp 16   Ht 5' 4\" (1.626 m)   Wt 78 kg (172 lb)   SpO2 99%   Breastfeeding Unknown   BMI 29.52 kg/m²     Temp (24hrs), Av.8 °F (36.6 °C), Min:97.7 °F (36.5 °C), Max:97.9 °F (36.6 °C)      Exam:         Patient without distress. Abdomen soft, fundus firm, nontender                 Lower extremities are negative for swelling, cords or tenderness. Labs:     Lab Results   Component Value Date/Time    WBC 18.4 (H) 2020 01:42 AM    HGB 13.3 2020 01:42 AM    HCT 40.0 2020 01:42 AM    PLATELET 023  01:42 AM    Hgb, External 13.6 2019       No results found for this or any previous visit (from the past 24 hour(s)).

## 2021-10-04 LAB
CHLAMYDIA, EXTERNAL: NEGATIVE
HBSAG, EXTERNAL: NEGATIVE
HEPATITIS C AB,   EXT: NEGATIVE
HIV, EXTERNAL: NEGATIVE
N. GONORRHEA, EXTERNAL: NEGATIVE
RUBELLA, EXTERNAL: NORMAL
T. PALLIDUM, EXTERNAL: NEGATIVE
TYPE, ABO & RH, EXTERNAL: NORMAL

## 2022-03-19 PROBLEM — Z37.9 NORMAL LABOR: Status: ACTIVE | Noted: 2020-01-06

## 2022-04-07 ENCOUNTER — HOSPITAL ENCOUNTER (OUTPATIENT)
Age: 37
Setting detail: OBSERVATION
Discharge: HOME | End: 2022-04-07
Attending: OBSTETRICS & GYNECOLOGY | Admitting: OBSTETRICS & GYNECOLOGY
Payer: COMMERCIAL

## 2022-04-07 VITALS
TEMPERATURE: 98.3 F | RESPIRATION RATE: 14 BRPM | HEIGHT: 64 IN | OXYGEN SATURATION: 94 % | DIASTOLIC BLOOD PRESSURE: 61 MMHG | HEART RATE: 117 BPM | SYSTOLIC BLOOD PRESSURE: 132 MMHG | WEIGHT: 172 LBS | BODY MASS INDEX: 29.37 KG/M2

## 2022-04-07 LAB
ABO + RH BLD: NORMAL
BLOOD GROUP ANTIBODIES SERPL: NORMAL
ERYTHROCYTE [DISTWIDTH] IN BLOOD BY AUTOMATED COUNT: 13.4 % (ref 11.5–14.5)
HCT VFR BLD AUTO: 38.4 % (ref 35–47)
HGB BLD-MCNC: 13.2 G/DL (ref 11.5–16)
MCH RBC QN AUTO: 32.9 PG (ref 26–34)
MCHC RBC AUTO-ENTMCNC: 34.4 G/DL (ref 30–36.5)
MCV RBC AUTO: 95.8 FL (ref 80–99)
NRBC # BLD: 0 K/UL (ref 0–0.01)
NRBC BLD-RTO: 0 PER 100 WBC
PLATELET # BLD AUTO: 196 K/UL (ref 150–400)
PMV BLD AUTO: 10.7 FL (ref 8.9–12.9)
RBC # BLD AUTO: 4.01 M/UL (ref 3.8–5.2)
SPECIMEN EXP DATE BLD: NORMAL
WBC # BLD AUTO: 12.4 K/UL (ref 3.6–11)

## 2022-04-07 PROCEDURE — 96372 THER/PROPH/DIAG INJ SC/IM: CPT

## 2022-04-07 PROCEDURE — 59412 ANTEPARTUM MANIPULATION: CPT

## 2022-04-07 PROCEDURE — 86900 BLOOD TYPING SEROLOGIC ABO: CPT

## 2022-04-07 PROCEDURE — G0378 HOSPITAL OBSERVATION PER HR: HCPCS

## 2022-04-07 PROCEDURE — 85027 COMPLETE CBC AUTOMATED: CPT

## 2022-04-07 PROCEDURE — 59025 FETAL NON-STRESS TEST: CPT

## 2022-04-07 PROCEDURE — 36415 COLL VENOUS BLD VENIPUNCTURE: CPT

## 2022-04-07 RX ORDER — TERBUTALINE SULFATE 1 MG/ML
0.25 INJECTION SUBCUTANEOUS
Status: DISCONTINUED | OUTPATIENT
Start: 2022-04-07 | End: 2022-04-07 | Stop reason: HOSPADM

## 2022-04-07 RX ORDER — GUAIFENESIN 100 MG/5ML
81 LIQUID (ML) ORAL DAILY
COMMUNITY
End: 2022-04-29

## 2022-04-07 NOTE — H&P
History & Physical    Name: Joana Gil MRN: [id number]  SSN: [id number]    YOB: 1985  Age: 31 y.o. Sex: female      Subjective:     Estimated Date of Delivery: None noted. OB History    Para Term  AB Living   2 1 1     1   SAB IAB Ectopic Molar Multiple Live Births           0 1      # Outcome Date GA Lbr Kentrell/2nd Weight Sex Delivery Anes PTL Lv   2 Current            1 Term 20 39w0d 15:00 / 04:49 3.5 kg M Vag-Spont EPI N KRYSTA     Cc: none  HPI:   34yo  presents to labor and delivery for scheduled external cephalic version for breech presentation. She has a history of term vaginal delivery. Baby is active. She denies labor symptoms. Past Medical History:   Diagnosis Date    Abnormal Papanicolaou smear of cervix 2019    F/U normal     Past Surgical History:   Procedure Laterality Date    HX OTHER SURGICAL      tendon repair on toe/right foot    HX WISDOM TEETH EXTRACTION       Social History     Occupational History    Not on file   Tobacco Use    Smoking status: Never Smoker    Smokeless tobacco: Never Used   Substance and Sexual Activity    Alcohol use: Not Currently    Drug use: Not Currently    Sexual activity: Yes     Birth control/protection: Pill     Family History   Problem Relation Age of Onset    Parkinson's Disease Father     Cancer Maternal Grandmother         Brain    Cancer Maternal Grandfather         pancreatic    Parkinson's Disease Paternal Grandmother        No Known Allergies  Prior to Admission medications    Medication Sig Start Date End Date Taking? Authorizing Provider   ibuprofen (MOTRIN) 800 mg tablet Take 1 Tab by mouth every eight (8) hours. 20   Cyndi Tejada MD   prenatal vit-calcium-iron-fa (PRENATAL PLUS WITH CALCIUM) 27 mg iron- 1 mg tab Take 1 Tab by mouth daily. Provider, Historical        Review of Systems negative    Objective:     Vitals: There were no vitals filed for this visit.      Physical Exam:  GEN: NAD, restng comfortably  Pulm: no resp distress  Abd: soft, gravid, NT  : deferred    Fetal Heart Rate: Tocometry:     Prenatal Labs:   Lab Results   Component Value Date/Time    Rubella, External <0.9 2019 12:00 AM    GrBStrep, External Negative 2019 12:00 AM    HBsAg, External Negative 2019 12:00 AM    HIV, External Non-reactive 2019 12:00 AM    RPR, External Negative 2019 12:00 AM    Gonorrhea, External Negative 2019 12:00 AM    Chlamydia, External Negative 2019 12:00 AM    ABO,Rh A Positive 2019 12:00 AM         Impression/Plan:     35yo  at 36+ weeks presents for scheduled ECV.     Procedure reviewed and consent signed  ROYAL yesterday normal  Plan discharge home after procedure

## 2022-04-07 NOTE — PROCEDURES
Date: 4/7/22    Pre procedure diagnosis: breech    Post procedure diagnosis: breech    Procedure: External Cephalic Version (failed)    Physician: Soco Aparicio MD    Assistant: Leeroy Gavin MD    EBL: none    Complications: none    Description of procedure:  After informed consent was reconfirmed with the patient, bedside ultrasound was performed, which revealed a morley viable IUP in luis breech presentation. Anterior placenta with adequate amniotic fluid with MVP >2cm. NST obtained prior to procedure as reactive and reassuring. Terbutaline was administered. Gentle pressure was placed on the buttocks to lift them out of the pelvis while gentle traction was applied to the fetal head in a counter clockwise direction. The fetus rotated to transverse but reverted immediately back to breech presentation. Fetal heart rate was noted to be in the 100s so the patient was placed on her side and the heart rate recovered immediately. She was placed back in the supine position, and a second attempt, this time in the clockwise rotation was performed without success. Fetal heart rate was reassuring. A third attempt in the counter clockwise direction was also unsucessful. Fetal heart rate remained reassuring. At this time, decision was made to stop the procedure. The patient tolerated the procedure well and was in a stable condition. NST was performed which was reactive and reassuring.

## 2022-04-07 NOTE — PROGRESS NOTES
2022  7:57 AM 30 YO  at 36.3 weeks for Eternal version with Dr. Kyra Multani. Pt to bathroom to change into gown, Consents signed  0845 PIV and labs.  Admission complete

## 2022-04-07 NOTE — DISCHARGE INSTRUCTIONS
Patient Education          Patient Education        Breech Birth: Care Instructions  Your Care Instructions     During most of your pregnancy, your baby has plenty of room to move around. Close to birth, there is not much room left. As birth gets close, most babies settle into a head-down position. When a baby's rear end (buttocks) or feet are down near the birth canal (vagina), it is called a breech position. Most breech babies are healthy. Most don't have problems after birth. You probably can't tell that your baby is breech. Your doctor may have told you about your baby's position during a visit. You may have had an ultrasound test to show that your baby is breech. Your doctor may give you exercises to do at home. These may help move your baby into the right position. If they don't, your doctor may try to turn your baby. Your doctor will use his or her hands to press certain parts of your belly. This often can work to move the baby. Before and after, you will have a test to make sure that your baby's heart is beating as it should. If your baby turns the right way, your doctor will check you often. This is to make sure that the baby stays head-down until labor starts. You may then be able to have a vaginal delivery. If your baby is breech when your labor starts, you are likely to have surgery to deliver the baby. This is called a  section (). While some breech babies are delivered through a vaginal birth, this may slightly increase health risks to the baby and the mother. Discuss the risks and benefits of a vaginal breech delivery with your doctor. Follow-up care is a key part of your treatment and safety. Be sure to make and go to all appointments, and call your doctor if you are having problems. It's also a good idea to know your test results and keep a list of the medicines you take. How can you care for yourself at home? · Have regular checkups all through your pregnancy.  This will help you know your baby's position before you go into labor. · Ask your doctor about special exercises that may help to turn your baby into the normal birth position. If your doctor recommends these exercises, do them as your doctor tells you to. When should you call for help? Call your doctor now or seek immediate medical care if:   Section: Before Your Surgery    What is a  section? A  section, or , is surgery to deliver your baby through a cut the doctor makes in your lower belly and uterus. In many cases, the doctor makes the cut, called an incision, just above the pubic hairline. In other cases, the incision runs from the belly button to the pubic hairline. Either incision leaves a scar, which usually fades with time. It is likely that the surgery can be done while you are awake but your belly is numb. This allows you to be awake for the birth of your baby. Less often, women need general anesthesia and are completely asleep during the surgery. It is usually safer to be awake for the . Your obstetrician and anesthesia provider will help you decide. Most women go home about 3 days after the surgery. You may feel better each day, but you will probably need about 6 weeks to fully recover. During the first few weeks you will need extra help with household chores. But you will be able to care for your baby, including breast-feeding and changing diapers. Follow-up care is a key part of your treatment and safety. Be sure to make and go to all appointments, and call your doctor if you are having problems. It's also a good idea to know your test results and keep a list of the medicines you take. What happens before surgery? Having surgery can be stressful. This information will help you understand what you can expect and how to safely prepare for surgery. Preparing for surgery  · Bring a list of questions to ask your doctors.  It is important that you understand exactly what surgery is planned, the risks, benefits, and other options before your surgery. · Tell your doctors ALL the medicines, vitamins, supplements, or herbal remedies you are taking. Keep a list of these with you, and bring this with you to every appointment. You will be told which medicine to take or to stop before your surgery. · Some medicines, such as aspirin or ibuprofen (Advil, Motrin), and certain vitamins and herbal remedies can increase the risk of bleeding or interact with anesthesia. You may be asked to stop these a week before surgery. · Before your surgery, you will speak with an anesthesia provider to discuss your anesthetic options, including the risks, benefits, and alternatives to each. This may be on the phone or in person. · Most women will be told to stop eating at least 8 hours before surgery. You will be given exact instructions for your surgery    Taking care of yourself before surgery  · Build healthy habits into your life. Changes are best made several weeks before surgery, since your body may react to sudden changes in your habits. · Stay as active as you can. · Eat a healthy diet. · If you have an advance directive--which may include a living will and a durable power of  for health care--let your doctor know. If you do not have one, you may want to prepare one so your doctor and loved ones know your health care wishes. Doctors recommend that everyone prepare these papers before surgery, regardless of the type of surgery or condition. What happens on the day of surgery? · Follow the instructions exactly about when to stop eating and drinking, or your surgery may be canceled. If your doctor has instructed you to take your medicines on the day of surgery, please do so using only a sip of water. Sips of water are fine leading up to surgery. · Take a bath or shower before you come in for your surgery.  Do not apply lotions, perfumes, deodorants, or nail polish. · Do NOT shave the surgical site Day of surgery. You may shave 24 hours prior to surgery  · Remove all jewelry, piercings, and contact lenses. · Leave your valuables at home. · Use CHG wipes provided to you by nurse after your shower before surgery. Allow to air dry before dressing    At the hospital or surgery center  · Bring a picture ID. · Before surgery you will be asked to repeat your full name and what surgery you are having. · A small tube (IV) will be placed in a vein, to give you fluids, antibiotics, and possibly medicine to help you relax. If you get medicine to help you relax, you will receive it only after delivery. Because of the combination of medicines given to keep you comfortable, you may not remember much about the operating room. · You will be kept comfortable and safe by your anesthesia provider. The anesthesia may make you sleep or may be spinal or epidural anesthesia that numbs your belly. This will depend on the type of  you are having, as well as a discussion between your doctor, the anesthesia provider, and you. · The surgery will take about 1 hour. · In the recovery room, the nurse will check to be sure you are stable and comfortable. It is important for you to tell your doctor and nurse how you feel and ask questions about any concerns you may have. Going home  · Be sure you have someone to drive you home. · For your safety, you should not drive until you are no longer taking pain medicines and you can move and react easily. This will take about 2 weeks. · Arrange for extra help at home after surgery, especially if you live alone or provide care for another person. · You will be given more specific instructions about recovering from your surgery, including activity and when you may return to work. When should you call your doctor? · You have questions or concerns. · You do not understand how to prepare for your surgery. Where can you learn more?     Go to [employer]    Enter F523 in the search box to learn more about \" Section: Before Your Surgery\". This care instruction is for use with your licensed healthcare professional. If you have questions about a medical condition or this instruction, always ask your healthcare professional. Care instructions adapted by [employer] (which disclaims liability or warranty for this information) from [employer], [address] [place] [address] . [employer] disclaims any warranty or liability for your use of this information.      · You think that you are in labor. Watch closely for changes in your health, and be sure to contact your doctor if you have any other questions or concerns. Where can you learn more? Go to http://www.gray.com/  Enter U366 in the search box to learn more about \"Breech Birth: Care Instructions. \"  Current as of: 2021               Content Version: 13.2  © 7299-4683 [employer] Incorporated. Care instructions adapted under license by [employer] (which disclaims liability or warranty for this information). If you have questions about a medical condition or this instruction, always ask your healthcare professional. [address] any warranty or liability for your use of this information.

## 2022-04-12 LAB — GRBS, EXTERNAL: POSITIVE

## 2022-04-25 ENCOUNTER — ANESTHESIA EVENT (OUTPATIENT)
Dept: LABOR AND DELIVERY | Age: 37
End: 2022-04-25
Payer: COMMERCIAL

## 2022-04-26 ENCOUNTER — ANESTHESIA (OUTPATIENT)
Dept: LABOR AND DELIVERY | Age: 37
End: 2022-04-26
Payer: COMMERCIAL

## 2022-04-26 ENCOUNTER — HOSPITAL ENCOUNTER (INPATIENT)
Age: 37
LOS: 3 days | Discharge: HOME | End: 2022-04-29
Attending: OBSTETRICS & GYNECOLOGY | Admitting: OBSTETRICS & GYNECOLOGY
Payer: COMMERCIAL

## 2022-04-26 LAB
ABO + RH BLD: NORMAL
BLOOD GROUP ANTIBODIES SERPL: NORMAL
ERYTHROCYTE [DISTWIDTH] IN BLOOD BY AUTOMATED COUNT: 13.1 % (ref 11.5–14.5)
HCT VFR BLD AUTO: 38.1 % (ref 35–47)
HGB BLD-MCNC: 13.3 G/DL (ref 11.5–16)
MCH RBC QN AUTO: 33 PG (ref 26–34)
MCHC RBC AUTO-ENTMCNC: 34.9 G/DL (ref 30–36.5)
MCV RBC AUTO: 94.5 FL (ref 80–99)
NRBC # BLD: 0 K/UL (ref 0–0.01)
NRBC BLD-RTO: 0 PER 100 WBC
PLATELET # BLD AUTO: 207 K/UL (ref 150–400)
PMV BLD AUTO: 11.1 FL (ref 8.9–12.9)
RBC # BLD AUTO: 4.03 M/UL (ref 3.8–5.2)
SPECIMEN EXP DATE BLD: NORMAL
WBC # BLD AUTO: 10.9 K/UL (ref 3.6–11)

## 2022-04-26 PROCEDURE — 36415 COLL VENOUS BLD VENIPUNCTURE: CPT

## 2022-04-26 PROCEDURE — 77030040361 HC SLV COMPR DVT MDII -B

## 2022-04-26 PROCEDURE — 75410000003 HC RECOV DEL/VAG/CSECN EA 0.5 HR: Performed by: OBSTETRICS & GYNECOLOGY

## 2022-04-26 PROCEDURE — 76060000078 HC EPIDURAL ANESTHESIA: Performed by: OBSTETRICS & GYNECOLOGY

## 2022-04-26 PROCEDURE — 74011000250 HC RX REV CODE- 250: Performed by: OBSTETRICS & GYNECOLOGY

## 2022-04-26 PROCEDURE — 76010000392 HC C SECN EA ADDL 0.5 HR: Performed by: OBSTETRICS & GYNECOLOGY

## 2022-04-26 PROCEDURE — 65410000002 HC RM PRIVATE OB

## 2022-04-26 PROCEDURE — 85027 COMPLETE CBC AUTOMATED: CPT

## 2022-04-26 PROCEDURE — 4A1HXCZ MONITORING OF PRODUCTS OF CONCEPTION, CARDIAC RATE, EXTERNAL APPROACH: ICD-10-PCS | Performed by: OBSTETRICS & GYNECOLOGY

## 2022-04-26 PROCEDURE — 86900 BLOOD TYPING SEROLOGIC ABO: CPT

## 2022-04-26 PROCEDURE — 74011250636 HC RX REV CODE- 250/636: Performed by: ANESTHESIOLOGY

## 2022-04-26 PROCEDURE — 74011000250 HC RX REV CODE- 250: Performed by: ANESTHESIOLOGY

## 2022-04-26 PROCEDURE — 76010000391 HC C SECN FIRST 1 HR: Performed by: OBSTETRICS & GYNECOLOGY

## 2022-04-26 PROCEDURE — 74011250636 HC RX REV CODE- 250/636: Performed by: OBSTETRICS & GYNECOLOGY

## 2022-04-26 RX ORDER — OXYTOCIN/RINGER'S LACTATE 30/500 ML
10 PLASTIC BAG, INJECTION (ML) INTRAVENOUS AS NEEDED
Status: DISCONTINUED | OUTPATIENT
Start: 2022-04-26 | End: 2022-04-26

## 2022-04-26 RX ORDER — KETOROLAC TROMETHAMINE 30 MG/ML
30 INJECTION, SOLUTION INTRAMUSCULAR; INTRAVENOUS
Status: DISCONTINUED | OUTPATIENT
Start: 2022-04-26 | End: 2022-04-26

## 2022-04-26 RX ORDER — NALBUPHINE HYDROCHLORIDE 10 MG/ML
2.5 INJECTION, SOLUTION INTRAMUSCULAR; INTRAVENOUS; SUBCUTANEOUS
Status: DISCONTINUED | OUTPATIENT
Start: 2022-04-26 | End: 2022-04-26

## 2022-04-26 RX ORDER — SODIUM CHLORIDE 0.9 % (FLUSH) 0.9 %
5-40 SYRINGE (ML) INJECTION AS NEEDED
Status: DISCONTINUED | OUTPATIENT
Start: 2022-04-26 | End: 2022-04-29 | Stop reason: HOSPADM

## 2022-04-26 RX ORDER — OXYCODONE AND ACETAMINOPHEN 5; 325 MG/1; MG/1
1 TABLET ORAL
Status: DISCONTINUED | OUTPATIENT
Start: 2022-04-26 | End: 2022-04-29 | Stop reason: HOSPADM

## 2022-04-26 RX ORDER — VALACYCLOVIR HYDROCHLORIDE 500 MG/1
1000 TABLET, FILM COATED ORAL 2 TIMES DAILY
Status: DISCONTINUED | OUTPATIENT
Start: 2022-04-26 | End: 2022-04-29 | Stop reason: HOSPADM

## 2022-04-26 RX ORDER — OXYTOCIN/RINGER'S LACTATE 30/500 ML
PLASTIC BAG, INJECTION (ML) INTRAVENOUS
Status: DISCONTINUED | OUTPATIENT
Start: 2022-04-26 | End: 2022-04-26 | Stop reason: HOSPADM

## 2022-04-26 RX ORDER — HYDROCORTISONE 1 %
CREAM (GRAM) TOPICAL AS NEEDED
Status: DISCONTINUED | OUTPATIENT
Start: 2022-04-26 | End: 2022-04-29 | Stop reason: HOSPADM

## 2022-04-26 RX ORDER — ACETAMINOPHEN 325 MG/1
650 TABLET ORAL
Status: DISCONTINUED | OUTPATIENT
Start: 2022-04-26 | End: 2022-04-29 | Stop reason: HOSPADM

## 2022-04-26 RX ORDER — OXYTOCIN/RINGER'S LACTATE 30/500 ML
10 PLASTIC BAG, INJECTION (ML) INTRAVENOUS AS NEEDED
Status: DISCONTINUED | OUTPATIENT
Start: 2022-04-26 | End: 2022-04-29 | Stop reason: HOSPADM

## 2022-04-26 RX ORDER — ONDANSETRON 2 MG/ML
4 INJECTION INTRAMUSCULAR; INTRAVENOUS
Status: DISCONTINUED | OUTPATIENT
Start: 2022-04-26 | End: 2022-04-29 | Stop reason: HOSPADM

## 2022-04-26 RX ORDER — OXYTOCIN/RINGER'S LACTATE 30/500 ML
87.3 PLASTIC BAG, INJECTION (ML) INTRAVENOUS AS NEEDED
Status: COMPLETED | OUTPATIENT
Start: 2022-04-26 | End: 2022-04-26

## 2022-04-26 RX ORDER — ONDANSETRON 2 MG/ML
4 INJECTION INTRAMUSCULAR; INTRAVENOUS
Status: DISCONTINUED | OUTPATIENT
Start: 2022-04-26 | End: 2022-04-26

## 2022-04-26 RX ORDER — VALACYCLOVIR HYDROCHLORIDE 1 G/1
1000 TABLET, FILM COATED ORAL 2 TIMES DAILY
COMMUNITY

## 2022-04-26 RX ORDER — MORPHINE SULFATE 0.5 MG/ML
INJECTION, SOLUTION EPIDURAL; INTRATHECAL; INTRAVENOUS
Status: COMPLETED | OUTPATIENT
Start: 2022-04-26 | End: 2022-04-26

## 2022-04-26 RX ORDER — SODIUM CHLORIDE, SODIUM LACTATE, POTASSIUM CHLORIDE, CALCIUM CHLORIDE 600; 310; 30; 20 MG/100ML; MG/100ML; MG/100ML; MG/100ML
125 INJECTION, SOLUTION INTRAVENOUS CONTINUOUS
Status: DISCONTINUED | OUTPATIENT
Start: 2022-04-26 | End: 2022-04-26

## 2022-04-26 RX ORDER — OXYTOCIN/RINGER'S LACTATE 30/500 ML
87.3 PLASTIC BAG, INJECTION (ML) INTRAVENOUS AS NEEDED
Status: DISCONTINUED | OUTPATIENT
Start: 2022-04-26 | End: 2022-04-26

## 2022-04-26 RX ORDER — SODIUM CHLORIDE 0.9 % (FLUSH) 0.9 %
5-40 SYRINGE (ML) INJECTION EVERY 8 HOURS
Status: DISCONTINUED | OUTPATIENT
Start: 2022-04-26 | End: 2022-04-29 | Stop reason: HOSPADM

## 2022-04-26 RX ORDER — DIPHENHYDRAMINE HYDROCHLORIDE 50 MG/ML
12.5 INJECTION, SOLUTION INTRAMUSCULAR; INTRAVENOUS
Status: DISCONTINUED | OUTPATIENT
Start: 2022-04-26 | End: 2022-04-29 | Stop reason: HOSPADM

## 2022-04-26 RX ORDER — KETOROLAC TROMETHAMINE 30 MG/ML
30 INJECTION, SOLUTION INTRAMUSCULAR; INTRAVENOUS
Status: DISCONTINUED | OUTPATIENT
Start: 2022-04-26 | End: 2022-04-29 | Stop reason: HOSPADM

## 2022-04-26 RX ORDER — MORPHINE SULFATE 10 MG/ML
10 INJECTION, SOLUTION INTRAMUSCULAR; INTRAVENOUS
Status: DISCONTINUED | OUTPATIENT
Start: 2022-04-26 | End: 2022-04-26

## 2022-04-26 RX ORDER — MORPHINE SULFATE 10 MG/ML
6 INJECTION, SOLUTION INTRAMUSCULAR; INTRAVENOUS
Status: DISCONTINUED | OUTPATIENT
Start: 2022-04-26 | End: 2022-04-26

## 2022-04-26 RX ORDER — AMMONIA 15 % (W/V)
1 AMPUL (EA) INHALATION AS NEEDED
Status: DISCONTINUED | OUTPATIENT
Start: 2022-04-26 | End: 2022-04-29 | Stop reason: HOSPADM

## 2022-04-26 RX ORDER — DOCUSATE SODIUM 100 MG/1
100 CAPSULE, LIQUID FILLED ORAL
Status: DISCONTINUED | OUTPATIENT
Start: 2022-04-26 | End: 2022-04-29 | Stop reason: HOSPADM

## 2022-04-26 RX ORDER — BUPIVACAINE HYDROCHLORIDE 5 MG/ML
INJECTION, SOLUTION EPIDURAL; INTRACAUDAL
Status: COMPLETED | OUTPATIENT
Start: 2022-04-26 | End: 2022-04-26

## 2022-04-26 RX ORDER — OXYCODONE AND ACETAMINOPHEN 5; 325 MG/1; MG/1
2 TABLET ORAL
Status: DISCONTINUED | OUTPATIENT
Start: 2022-04-26 | End: 2022-04-29 | Stop reason: HOSPADM

## 2022-04-26 RX ORDER — SODIUM CHLORIDE, SODIUM LACTATE, POTASSIUM CHLORIDE, CALCIUM CHLORIDE 600; 310; 30; 20 MG/100ML; MG/100ML; MG/100ML; MG/100ML
125 INJECTION, SOLUTION INTRAVENOUS CONTINUOUS
Status: DISCONTINUED | OUTPATIENT
Start: 2022-04-26 | End: 2022-04-29 | Stop reason: HOSPADM

## 2022-04-26 RX ORDER — SIMETHICONE 80 MG
80 TABLET,CHEWABLE ORAL
Status: DISCONTINUED | OUTPATIENT
Start: 2022-04-26 | End: 2022-04-29 | Stop reason: HOSPADM

## 2022-04-26 RX ORDER — IBUPROFEN 600 MG/1
600 TABLET ORAL
Status: DISCONTINUED | OUTPATIENT
Start: 2022-04-26 | End: 2022-04-29 | Stop reason: HOSPADM

## 2022-04-26 RX ADMIN — SODIUM CHLORIDE 20 MCG/MIN: 9 INJECTION, SOLUTION INTRAVENOUS at 10:34

## 2022-04-26 RX ADMIN — OXYTOCIN 87.3 MILLI-UNITS/MIN: 10 INJECTION INTRAVENOUS at 11:41

## 2022-04-26 RX ADMIN — Medication 30 MG: at 17:13

## 2022-04-26 RX ADMIN — ONDANSETRON HYDROCHLORIDE 4 MG: 2 INJECTION, SOLUTION INTRAMUSCULAR; INTRAVENOUS at 10:59

## 2022-04-26 RX ADMIN — Medication 30 MG: at 11:53

## 2022-04-26 RX ADMIN — BUPIVACAINE HYDROCHLORIDE 10 MG: 5 INJECTION, SOLUTION EPIDURAL; INTRACAUDAL; PERINEURAL at 10:20

## 2022-04-26 RX ADMIN — SODIUM CHLORIDE, POTASSIUM CHLORIDE, SODIUM LACTATE AND CALCIUM CHLORIDE 999 ML/HR: 600; 310; 30; 20 INJECTION, SOLUTION INTRAVENOUS at 08:40

## 2022-04-26 RX ADMIN — CEFAZOLIN SODIUM 2 G: 1 INJECTION, POWDER, FOR SOLUTION INTRAMUSCULAR; INTRAVENOUS at 09:57

## 2022-04-26 RX ADMIN — Medication 0.25 MG: at 10:20

## 2022-04-26 RX ADMIN — Medication 999 ML/HR: at 10:43

## 2022-04-26 NOTE — ROUTINE PROCESS
Bedside shift change report given to BLAS Haas RN (oncoming nurse) by Lam Steele RN and JACY Brown RN (offgoing nurse). Report included the following information SBAR and Kardex.

## 2022-04-26 NOTE — H&P
History & Physical    Name: Nathan Bucio MRN: [id number]  SSN: [id number]    YOB: 1985  Age: 31 y.o. Sex: female      Subjective:     Estimated Date of Delivery: 22  OB History    Para Term  AB Living   2 1 1     1   SAB IAB Ectopic Molar Multiple Live Births           0 1      # Outcome Date GA Lbr Kentrell/2nd Weight Sex Delivery Anes PTL Lv   2 Current            1 Term 20 39w0d 15:00 / 04:49 3.5 kg M Vag-Spont EPI N KRYSTA     Cc: none  HPI:   32yo  @ 39w1d presents for a scheduled  section for breech presentation. She's s/p failed ECV on 22. She has no complaints today. Last week, she was diagnosed with a primary genital HSVT1 infection and is currently taking valtrex. The lesion has improved and doesn't bother her as much today. Patient did have COVID earlier in pregnancy. Third trimester growth was 30%. She is GBS positive. Past Medical History:   Diagnosis Date    Abnormal Papanicolaou smear of cervix 2019    F/U normal     Past Surgical History:   Procedure Laterality Date    HX OTHER SURGICAL      tendon repair on toe/right foot    HX WISDOM TEETH EXTRACTION       Social History     Occupational History    Not on file   Tobacco Use    Smoking status: Never Smoker    Smokeless tobacco: Never Used   Vaping Use    Vaping Use: Never used   Substance and Sexual Activity    Alcohol use: Not Currently    Drug use: Not Currently    Sexual activity: Yes     Birth control/protection: Pill     Family History   Problem Relation Age of Onset    Parkinson's Disease Father     Cancer Maternal Grandmother         Brain    Cancer Maternal Grandfather         pancreatic    Parkinson's Disease Paternal Grandmother        No Known Allergies  Prior to Admission medications    Medication Sig Start Date End Date Taking? Authorizing Provider   valACYclovir (Valtrex) 1 gram tablet Take 1,000 mg by mouth two (2) times a day.    Yes Provider, Historical   aspirin 81 mg chewable tablet Take 81 mg by mouth daily. Indications: HO covid infection    Provider, Historical   ibuprofen (MOTRIN) 800 mg tablet Take 1 Tab by mouth every eight (8) hours. Patient not taking: Reported on 2022   Marcos Fernando MD   prenatal vit-calcium-iron-fa (PRENATAL PLUS WITH CALCIUM) 27 mg iron- 1 mg tab Take 1 Tab by mouth daily. Provider, Historical        Review of Systems negative    Objective:     Vitals:  Vitals:    22 0818 22 0857   BP: (!) 116/59    Pulse: 72    Resp: 14    Temp: 98.2 °F (36.8 °C)    SpO2: 95%    Weight:  78 kg (172 lb)   Height:  5' 4\" (1.626 m)        Physical Exam:  GEN: NAD, resting comfortably in bed  Pulm: no resp distress  Abd: soft, gravid, NT, breech by Leopold's  : deferred      Fetal Heart Rate: 130, moderate variability, positive accelerations, no decelerations  Tocometry: 1 contraction in 20 min    Prenatal Labs:   Lab Results   Component Value Date/Time    ABO/Rh(D) A POSITIVE 2022 08:37 AM    Rubella, External immune 10/04/2021 12:00 AM    GrBStrep, External Negative 2019 12:00 AM    HBsAg, External negative 10/04/2021 12:00 AM    HIV, External negative 10/04/2021 12:00 AM    RPR, External Negative 2019 12:00 AM    Gonorrhea, External negative 10/04/2021 12:00 AM    Chlamydia, External negative 10/04/2021 12:00 AM    ABO,Rh A positive 10/04/2021 12:00 AM         Impression/Plan:     35yo  @ 39w1d who presents for a scheduled  for breech. 1. Proceed to OR for planned procedure. Consent reviewed and signed. -CBC, T&S  -Ancef 2g IV pre-op  2. Primary genital HSV T1 infection. Plan delivery by . Will notify peds after delivery. Complete the 7 day course of valtrex (started ). 3. GBS pos  4.  COVID vaccinated    Discussed the risks of surgery including the risks of bleeding, infection, deep vein thrombosis, and surgical injuries to internal organs including but not limited to the bowels, bladder, rectum, and female reproductive organs. The patient understands the risks; any and all questions were answered to the patient's satisfaction.

## 2022-04-26 NOTE — ANESTHESIA PREPROCEDURE EVALUATION
Relevant Problems   No relevant active problems       Anesthetic History   No history of anesthetic complications            Review of Systems / Medical History  Patient summary reviewed, nursing notes reviewed and pertinent labs reviewed    Pulmonary  Within defined limits                 Neuro/Psych   Within defined limits           Cardiovascular  Within defined limits                     GI/Hepatic/Renal  Within defined limits              Endo/Other  Within defined limits           Other Findings              Physical Exam    Airway  Mallampati: I  TM Distance: > 6 cm  Neck ROM: normal range of motion   Mouth opening: Normal     Cardiovascular  Regular rate and rhythm,  S1 and S2 normal,  no murmur, click, rub, or gallop             Dental  No notable dental hx       Pulmonary  Breath sounds clear to auscultation               Abdominal  GI exam deferred       Other Findings            Anesthetic Plan    ASA: 2  Anesthesia type: spinal          Induction: Intravenous  Anesthetic plan and risks discussed with: Patient

## 2022-04-26 NOTE — PROGRESS NOTES
Bedside and Verbal shift change report given to GEORGIE Rodriguez RN (oncoming nurse) by MELISA Marti RN (offgoing nurse). Report included the following information SBAR, Kardex, Intake/Output, MAR and Recent Results. 1150: Baby to L breast, nursing well. 1230: To R breast, nursing well. 1315:  Transferred to [address] [place] [address] via hospital bed accompanied by GEORGIE Rodriguez RN and  and CLARITZA Briones. Pt holding infant on transfer. 1325:  TRANSFER - OUT REPORT:    Verbal report given to JACY Solomon RN(name) on Svetlana Coats  being transferred to [place] [address](unit) for routine progression of care       Report consisted of patients Situation, Background, Assessment and   Recommendations(SBAR). Information from the following report(s) SBAR, Kardex, Intake/Output, MAR and Recent Results was reviewed with the receiving nurse. Lines:   Peripheral IV 04/26/22 Right Hand (Active)   Site Assessment Clean, dry, & intact 04/26/22 0937   Phlebitis Assessment 0 04/26/22 0937   Infiltration Assessment 0 04/26/22 0937   Dressing Status Clean, dry, & intact 04/26/22 0937   Dressing Type Tape;Transparent 04/26/22 0937   Hub Color/Line Status Pink; Infusing 04/26/22 8793   Action Taken Blood drawn 04/26/22 8318        Opportunity for questions and clarification was provided.       Patient transported with:   Registered Nurse

## 2022-04-26 NOTE — OP NOTES
Operative Note    Name: Ewa Lawler   Medical Record Number: [id number]   YOB: 1985  Today's Date: 2022      Pre-operative Diagnosis:   1. 37yo  @ 39w1d  2. IUP in luis breech presentation, failed ECV  3. Primary genital HSV T1 outbreak, on day  Valtrex    Post-operative Diagnosis:   1. 32yo [id number] s/p 1LTCS  2. Viable female   3. Primary genital HSV T1 outbreak, on day  Valtrex    Operation: Primary low transverse  section     Surgeon(s):  Evelina Olivera MD    Anesthesia: Spinal    Prophylactic Antibiotics: Ancef    DVT Prophylaxis: shelli hose and SCDs     Fetal Description: morley     Birth Information:   Information for the patient's :  Alin Brenner [[id number]]   Delivery of a   female infant on 2022 at 10:41 AM. Apgars were 9  and 9 . Umbilical Cord: 3 Vessels     Umbilical Cord Events: Nuchal Cord Without Compressions     Placenta: Expressed removal with Normal appearance. Amniotic Fluid Volume:        Amniotic Fluid Description:           Placenta:  expressed    Estimated Blood Loss (ml):  QBL pending    Specimens: None           Complications:  none    Procedure Detail:      After proper patient identification and consent, the patient was taken to the operating room, where spinal anesthesia was administered and found to be adequate. Walton catheter had been placed using sterile technique. The patient was prepped and draped in the normal sterile fashion. The abdomen was entered using the Pfannenstiel technique. The peritoneum was entered sharply well superior to the bladder without any apparent injury. The bladder flap was not created. A low transverse uterine incision was made with the scalpel and extended with blunt finger dissection. Amniotomy was performed with return of a medium amount of clear fluid.   The buttocks were grasped and the body was easily delivered with the assistance of gentle fundal pressure  Each leg was individually splinted and delivered across the fetal body. The remained of the body then delivered with gentle fundal pressure with the head flexed. Loose nuchal cord was reduced. The  was vigorous and crying immediately after delivery. Mouth and nose were suctioned. Delayed cord clamping was performed, then cut. The baby was handed off to Nursing staff in attendance. The placenta was then removed from the uterus. The uterus was curettaged with a moist lap pad and cleared of all clots and debris. The uterine incision was closed with 0 monocryl, double layer, in a running locking fashion with good hemostasis assured. The uterus was returned to the abdomen and the gutters were cleared of all clots and debris. Good hemostasis was again reassured. The peritoneum was closed with 3-0 vicryl, then the rectus muscles were reapproximated with 3-0 vicryl in 2 mattress sutures. The fascia was closed with #1 vicryl in a running fashion. Good hemostasis was assured. The skin was closed with a 4-0 monocryl closure. The patient tolerated the procedure well. Sponge, lap, and needle counts were correct times three and the patient and baby were taken to recovery/postpartum room in stable condition.     Britton Degroot MD  2022  11:37 AM

## 2022-04-26 NOTE — ANESTHESIA PROCEDURE NOTES
Spinal Block    Start time: 4/26/2022 10:15 AM  End time: 4/26/2022 10:20 AM  Performed by: Ajay Yeboah MD  Authorized by: Ajay Yeboah MD     Pre-procedure:   Indications: primary anesthetic  Preanesthetic Checklist: risks and benefits discussed and timeout performed      Spinal Block:   Patient Position:  Seated    Prep: Betadine      Location:  L3-4  Technique:  Single shot        Needle:   Needle Type:  Pencil-tip  Needle Gauge:  25 G  Attempts:  1      Events: CSF confirmed, no blood with aspiration and no paresthesia        Assessment:  Insertion:  Uncomplicated  Patient tolerance:  Patient tolerated the procedure well with no immediate complications

## 2022-04-26 NOTE — LACTATION NOTE
This note was copied from a baby's chart. Initial Lactation Consultation - Baby born by  this morning to a  mom at 44 1/7 weeks gestation. Mom states she breast fed her first child for 9 months with an adequate milk supply. She said this baby has latched a couple times. She said baby was sucking rhythmically with some swallows noted. Baby spitty when I was in the room with slight color change. (dusky) After burping and spitting up a small amount of thick clear fluid baby pinked up quickly. I encouraged parents to burp the baby frequently. Feeding Plan: Mother will keep baby skin to skin as often as possible, feed on demand, respond to feeding cues, obtain latch, listen for audible swallowing, be aware of signs of oxytocin release/ cramping, thirst and sleepiness while breastfeeding. Mom will not limit the time the baby is at the breast. She will allow the baby to completely finish one breast and then offer the second breast at each feeding.

## 2022-04-26 NOTE — ANESTHESIA POSTPROCEDURE EVALUATION
Procedure(s):   SECTION. spinal    Anesthesia Post Evaluation        Patient location during evaluation: PACU  Patient participation: complete - patient participated  Level of consciousness: awake and alert  Pain management: adequate  Airway patency: patent  Anesthetic complications: no  Cardiovascular status: acceptable  Respiratory status: acceptable  Hydration status: acceptable  Comments: I have seen and evaluated the patient and is ready for discharge.  Alem Frost MD    Post anesthesia nausea and vomiting:  none      INITIAL Post-op Vital signs:   Vitals Value Taken Time   /76 22 1132   Temp 37 °C (98.6 °F) 22 1132   Pulse 76 22 1132   Resp 12 22 1132   SpO2 99 % 22 1132

## 2022-04-26 NOTE — PROGRESS NOTES
7852 Patient arrived to LD 8 ambulatory for scheduled primary c/section for breech. Assessment started. [id number] Dr. Adelaida Avitia at bedside. Reviewed fetal monitor strip. Discussing plan of care with patient. All questions addressed at this time. 1009 Patient transferred from [address] [place] [address] Rd 14 to OR 2 for scheduled c/section    36 Delivery of liveborn female by Dr. Adelaida Avitia via primary c/section. 1123 Patient transferred from [place] 2 to  8 for recovery    1128 Recovery started. 1140 Bedside and Verbal shift change report given to GEORGIE Morales RN  (oncoming nurse) by MELISA Shepherd RN  (offgoing nurse). Report included the following information SBAR, OR Summary, Procedure Summary, Intake/Output, MAR and Recent Results. KAI THOMAS is a 78y F PMH HTN, HLD, SLE, RA, hypothyroidism, provoked R. popliteal DVT in 2019 on eliquis,  Multilevel Denerative Disc Disease of T and L Spine, S/P Posterior T10-L5 instrumented fusion, L1-L5 laminectomies, L L4-L5 foraminotomy.    #Multilevel Denerative Disc Disease of T and L Spine,   - S/P Posterior T10-L5 instrumented fusion, L1-L5 laminectomies, L L4-L5 foraminotomy 1/1/21 by Dr. Jaja Campos. Staples removed   - continue Comprehensive Rehab Program of PT/OT- 3 hrs/day 5 days/week  - Precautions:  spinal, RA, cardiac, contact and droplet precautions    #COVID19 infection  -PCR + 1/29/21  - Ct A/P 2/5: 1. Pattern of lung base groundglass opacification   -CTA 2/9:  No pulmonary embolism to the segmental branches. Mild bilateral patchy airspace disease compatible with COVID-19 pneumonia.  - ID note appreciated.  Completed 5 day course remdesivir, off decadron   - deep breathing exercises (refuses IS), increased sitting to address atelectasis discussed    #nausea/vomiting; improving  - abdominal CT -2/5: Colonic diverticulosis without CT evidence of acute diverticulitis  - AXR (-) 2/14  - possible 2/2 macrobid use. ID note appreciate, ID appreciated, off antibiotics/decadron  - continue florastor. Maalox added 2/16  -  TBili  0.7  /  DBili  x   /  AST  22  /  ALT  12  /  AlkPhos  100  02-15;  WBC 7.06 12/16    #hypokalemia  - K 3.4 (2/22) - 40meq x 1     # Constipation:   - bowel regimen: Senna / Miralax daily  - MOM and dulcolax PRN,   - +BM 2/22    #urinary frequency  - Urine Cx  - >100k Ecoli   - s/p macrobid 100 bid x 3 days  - repeat UA with bacteruria +nitrites. ?colonization  -discussed with hospitalist 2/19, continue to monitor off Abx     #SLE/RA  -continue plaquenil 200mg po daily    #h/o Provoked DVT in 2019  - US July 2019:  DVT noted in the right popliteal vein and visualized segment of the right  posterior tibial vein.  - currently on lovenox for dvt ppx  - Follows with vascular cardiologist Dr. Hoyos outpatient    #HTN:  - BP stable off meds     #HLD:  - continue lipitor 20mg po daily    #hypothyroidism:  - continue levothyroxine 200mcg po daily    # sleep  - melatonin 3mg PRN    #Pain Mgmt :   - Tylenol PRN mild pain  - Oxycodone 5mg IR PRN severe pain  -  Oxycontin ER BID (renewed 2/4)    #GI/Bowel Mgmt/ constipation:   -continue Senna, miralax daily, MOM prn  - dulcolax suppository if no BM with MOM  - simethicone PRN gas  - zofran, maalox for Nausea, dyspepsia PRN  -prune juice with meal trays    #FEN :  - Diet - Regular     # DVT prophylaxis :  - Lovenox BID  -LE edema:  ACE wraps LE     #Case discussed in IDT rounds 2/17:  -ambulates 75 feet with RW and CG, fluctuating to 50 feet CG, CG transfers, mod assist LB dressing set up UB dressing, min assist hygiene a  -goals: min assist LB dressing, min assist toileting, supervision transfers, CG /CS ambulation  -SHAZIA when bed available/authorization obtained    Case discussed in detail with daughter Adeline 2/16    #LABS:  NSGY re: clearance to restart eliquis. Continue lovenox for now pending recs  CBC BMP 2/25          KAI THOMSA is a 78y F PMH HTN, HLD, SLE, RA, hypothyroidism, provoked R. popliteal DVT in 2019 on eliquis,  Multilevel Denerative Disc Disease of T and L Spine, S/P Posterior T10-L5 instrumented fusion, L1-L5 laminectomies, L L4-L5 foraminotomy.    #Multilevel Denerative Disc Disease of T and L Spine,   - S/P Posterior T10-L5 instrumented fusion, L1-L5 laminectomies, L L4-L5 foraminotomy 1/1/21 by Dr. Jaja Campos. Staples removed   - message left for Dr Campos re: restarting eliquis 2/22  - continue Comprehensive Rehab Program of PT/OT- 3 hrs/day 5 days/week  - Precautions:  spinal, RA, cardiac, contact and droplet precautions    #COVID19 infection  -PCR + 1/29/21. Will repeat COVID swab 2/22  - Ct A/P 2/5: 1. Pattern of lung base groundglass opacification   -CTA 2/9:  No pulmonary embolism to the segmental branches. Mild bilateral patchy airspace disease compatible with COVID-19 pneumonia.  - ID note appreciated.  Completed 5 day course remdesivir, off decadron   - deep breathing exercises (refuses IS), increased sitting to address atelectasis discussed    #nausea/vomiting; improving  - abdominal CT -2/5: Colonic diverticulosis without CT evidence of acute diverticulitis  - AXR (-) 2/14  - possible 2/2 macrobid use. ID note appreciate, ID appreciated, off antibiotics/decadron  - continue florastor. Maalox added 2/16  - improved    #hypokalemia  - K 3.4 (2/22) - 40meq x 1  - BMP in AM 2/23     # Constipation:   - bowel regimen: Senna / Miralax daily  - MOM and dulcolax PRN,   - +BM 2/22    #h/o Ecoli UTI  - s/p macrobid 100 bid x 3 days    #SLE/RA  -continue plaquenil 200mg po daily    #h/o Provoked DVT in 2019  - US July 2019:  DVT noted in the right popliteal vein and visualized segment of the right  posterior tibial vein.  - currently on lovenox for dvt ppx. Message left for NSGY re: switching back to eliquis, date of surgery 1/1  - Follows with vascular cardiologist Dr. Hoyos outpatient    #HTN:  - BP stable off meds     #HLD:  - continue lipitor 20mg po daily    #hypothyroidism:  - continue levothyroxine 200mcg po daily    # sleep  - melatonin 3mg PRN    #Pain Mgmt :   - Tylenol PRN mild pain  - Oxycodone 5mg IR PRN severe pain  -  Oxycontin ER BID (renewed 2/4)    #GI/Bowel Mgmt/ constipation:   -continue Senna, miralax daily, MOM prn  - dulcolax suppository if no BM with MOM  - simethicone PRN gas  - zofran, maalox for Nausea, dyspepsia PRN    #FEN :  - Diet - Regular     # DVT prophylaxis :  - Lovenox BID  -LE edema:  ACE wraps LE     #Case discussed in IDT rounds 2/17:  -ambulates 75 feet with RW and CG, fluctuating to 50 feet CG, CG transfers, mod assist LB dressing set up UB dressing, min assist hygiene a  -goals: min assist LB dressing, min assist toileting, supervision transfers, CG /CS ambulation  -SHAZIA when bed available/authorization obtained    #LABS:  NSGY re: clearance to restart eliquis.  CBC BMP 2/25          KAI THOMAS is a 78y F PMH HTN, HLD, SLE, RA, hypothyroidism, provoked R. popliteal DVT in 2019 on eliquis,  Multilevel Denerative Disc Disease of T and L Spine, S/P Posterior T10-L5 instrumented fusion, L1-L5 laminectomies, L L4-L5 foraminotomy.    #Multilevel Denerative Disc Disease of T and L Spine,   - S/P Posterior T10-L5 instrumented fusion, L1-L5 laminectomies, L L4-L5 foraminotomy 1/1/21 by Dr. Jaja Campos. Staples removed   - message left for Dr Campos re: restarting eliquis 2/22  - continue Comprehensive Rehab Program of PT/OT- 3 hrs/day 5 days/week  - Precautions:  spinal, RA, cardiac, contact and droplet precautions    #COVID19 infection  -PCR + 1/29/21. Will repeat COVID swab 2/22  - Ct A/P 2/5: 1. Pattern of lung base groundglass opacification   -CTA 2/9:  No pulmonary embolism to the segmental branches. Mild bilateral patchy airspace disease compatible with COVID-19 pneumonia.  - ID note appreciated.  Completed 5 day course remdesivir, off decadron   - deep breathing exercises (refuses IS), increased sitting to address atelectasis discussed    #nausea/vomiting; improving  - abdominal CT -2/5: Colonic diverticulosis without CT evidence of acute diverticulitis  - AXR (-) 2/14  - possible 2/2 macrobid use. ID note appreciate, ID appreciated, off antibiotics/decadron  - continue florastor. Maalox added 2/16  - improved    #hypokalemia  - K 3.4 (2/22) - 40meq x 1  - BMP in AM 2/23     # Constipation:   - bowel regimen: Senna / Miralax daily  - MOM and dulcolax PRN,   - +BM 2/22    #h/o Ecoli UTI  - s/p macrobid 100 bid x 3 days    #SLE/RA  -continue plaquenil 200mg po daily    #h/o Provoked DVT in 2019  - US July 2019:  DVT noted in the right popliteal vein and visualized segment of the right  posterior tibial vein.  - currently on lovenox for dvt ppx. Message left for NSGY re: switching back to eliquis, date of surgery 1/1  - Follows with vascular cardiologist Dr. Hoyos outpatient    #HTN:  - BP stable off meds     #HLD:  - continue lipitor 20mg po daily    #hypothyroidism:  - continue levothyroxine 200mcg po daily    # sleep  - melatonin 3mg PRN    #Pain Mgmt :   - Tylenol PRN mild pain  - Oxycodone 5mg IR PRN severe pain  -  Oxycontin ER BID (renewed 2/4)    #GI/Bowel Mgmt/ constipation:   -continue Senna, miralax daily, MOM prn  - dulcolax suppository if no BM with MOM  - simethicone PRN gas  - zofran, maalox for Nausea, dyspepsia PRN    #FEN :  - Diet - Regular     # DVT prophylaxis :  - Lovenox BID  -LE edema:  ACE wraps LE     #Case discussed in IDT rounds 2/17:  -ambulates 75 feet with RW and CG, fluctuating to 50 feet CG, CG transfers, mod assist LB dressing set up UB dressing, min assist hygiene a  -goals: min assist LB dressing, min assist toileting, supervision transfers, CG /CS ambulation  -SHAZIA when bed available/authorization obtained    #LABS:  LEONIDES re: clearance to restart eliquis.  CBC BMP 2/25     addendum 4:51 PM:  NSGY cleared patient to resume eliquis. Will start 5 mg bid 2/23 (already had lovenox dose today)

## 2022-04-26 NOTE — L&D DELIVERY NOTE
Delivery Summary    Patient: Nidia Qiu MRN: [id number]  SSN: [id number]    YOB: 1985  Age: 31 y.o. Sex: female        Information for the patient's :  Jose Ortiz [[id number]]     Uncomplicated primary low transverse  section for luis breech presentation.      Labor Events:    Labor: No    Steroids: None   Cervical Ripening Date/Time:       Cervical Ripening Type: None   Antibiotics During Labor: No   Rupture Identifier: Sac 1    Rupture Date/Time: 2022     Rupture Type: AROM   Amniotic Fluid Volume:      Amniotic Fluid Description:      Amniotic Fluid Odor:      Induction: None       Induction Date/Time:        Indications for Induction:      Augmentation: None   Augmentation Date/Time:      Indications for Augmentation:     Labor complications: None       Additional complications:        Delivery Events:  Indications For Episiotomy:     Episiotomy: None   Perineal Laceration(s): None   Repaired:     Periurethral Laceration Location:      Repaired:     Labial Laceration Location:     Repaired:     Sulcal Laceration Location:     Repaired:     Vaginal Laceration Location:     Repaired:     Cervical Laceration Location:     Repaired:     Repair Suture: None   Number of Repair Packets:     Estimated Blood Loss (ml):  ml   Quantitaive Blood Loss (ml):             Delivery Date: 2022    Delivery Time: 10:41 AM   Delivery Type: , Low Transverse     Details    Trial of Labor: No   Primary/Repeat: Primary   Priority: Routine   Indications:  Breech       Sex:  Female     Gestational Age: 36w3d  Delivery Clinician:  Humberto Addison MD  Living Status: Living   Delivery Location: L&D OR 2          APGARS  One minute Five minutes Ten minutes   Skin color: 1   1        Heart rate: 2   2        Grimace: 2   2        Muscle tone: 2   2        Breathin   2        Totals: 9   9          Presentation: Breech    Position:        Resuscitation Method:  Suctioning-bulb     Meconium Stained: Terminal      Cord Information: 3 Vessels  Complications: Nuchal Cord Without Compressions  Cord around: head  Delayed cord clamping? Yes  Cord clamped date/time:   Disposition of Cord Blood: Discard    Blood Gases Sent?: No    Placenta:  Date/Time: 2022 10:42 AM  Removal: Expressed      Appearance: Normal      Measurements:  Birth Weight:        Birth Length:        Head Circumference:        Chest Circumference:       Abdominal Girth: Other Providers:   Karin ALEXIS;CLOVER GARVIN;WEST RIOJAS;;;DONALD CEDEÑO, Obstetrician;Primary Nurse;Primary [place] Nurse;Nicu Nurse;Neonatologist;Anesthesiologist             Group B Strep:   Lab Results   Component Value Date/Time    GrBStrep, External Negative 2019 12:00 AM     Information for the patient's :  Corie Gabriel [[id number]]   No results found for: ABORH, PCTABR, PCTDIG, BILI, ABORHEXT, ABORH     No results for input(s): PCO2CB, PO2CB, HCO3I, SO2I, IBD, PTEMPI, SPECTI, PHICB, ISITE, IDEV, IALLEN in the last 72 hours.

## 2022-04-27 LAB
BASOPHILS # BLD: 0 K/UL (ref 0–0.1)
BASOPHILS NFR BLD: 0 % (ref 0–1)
DIFFERENTIAL METHOD BLD: ABNORMAL
EOSINOPHIL # BLD: 0.1 K/UL (ref 0–0.4)
EOSINOPHIL NFR BLD: 1 % (ref 0–7)
ERYTHROCYTE [DISTWIDTH] IN BLOOD BY AUTOMATED COUNT: 12.8 % (ref 11.5–14.5)
HCT VFR BLD AUTO: 31.4 % (ref 35–47)
HGB BLD-MCNC: 10.7 G/DL (ref 11.5–16)
IMM GRANULOCYTES # BLD AUTO: 0.1 K/UL (ref 0–0.04)
IMM GRANULOCYTES NFR BLD AUTO: 1 % (ref 0–0.5)
LYMPHOCYTES # BLD: 2.1 K/UL (ref 0.8–3.5)
LYMPHOCYTES NFR BLD: 21 % (ref 12–49)
MCH RBC QN AUTO: 33.1 PG (ref 26–34)
MCHC RBC AUTO-ENTMCNC: 34.1 G/DL (ref 30–36.5)
MCV RBC AUTO: 97.2 FL (ref 80–99)
MONOCYTES # BLD: 0.7 K/UL (ref 0–1)
MONOCYTES NFR BLD: 7 % (ref 5–13)
NEUTS SEG # BLD: 7 K/UL (ref 1.8–8)
NEUTS SEG NFR BLD: 70 % (ref 32–75)
NRBC # BLD: 0 K/UL (ref 0–0.01)
NRBC BLD-RTO: 0 PER 100 WBC
PLATELET # BLD AUTO: 168 K/UL (ref 150–400)
PMV BLD AUTO: 11 FL (ref 8.9–12.9)
RBC # BLD AUTO: 3.23 M/UL (ref 3.8–5.2)
WBC # BLD AUTO: 10.1 K/UL (ref 3.6–11)

## 2022-04-27 PROCEDURE — 65410000002 HC RM PRIVATE OB

## 2022-04-27 PROCEDURE — 36415 COLL VENOUS BLD VENIPUNCTURE: CPT

## 2022-04-27 PROCEDURE — 74011250637 HC RX REV CODE- 250/637: Performed by: OBSTETRICS & GYNECOLOGY

## 2022-04-27 PROCEDURE — 77030019905 HC CATH URETH INTMIT MDII -A

## 2022-04-27 PROCEDURE — 77030005513 HC CATH URETH FOL11 MDII -B

## 2022-04-27 PROCEDURE — 74011250636 HC RX REV CODE- 250/636: Performed by: ANESTHESIOLOGY

## 2022-04-27 PROCEDURE — 85025 COMPLETE CBC W/AUTO DIFF WBC: CPT

## 2022-04-27 RX ADMIN — IBUPROFEN 600 MG: 600 TABLET ORAL at 12:31

## 2022-04-27 RX ADMIN — VALACYCLOVIR HYDROCHLORIDE 1000 MG: 500 TABLET, FILM COATED ORAL at 18:48

## 2022-04-27 RX ADMIN — IBUPROFEN 600 MG: 600 TABLET ORAL at 18:48

## 2022-04-27 RX ADMIN — IBUPROFEN 600 MG: 600 TABLET ORAL at 05:54

## 2022-04-27 RX ADMIN — KETOROLAC TROMETHAMINE 30 MG: 30 INJECTION, SOLUTION INTRAMUSCULAR at 00:08

## 2022-04-27 RX ADMIN — ACETAMINOPHEN 650 MG: 325 TABLET ORAL at 20:33

## 2022-04-27 RX ADMIN — ACETAMINOPHEN 650 MG: 325 TABLET ORAL at 15:07

## 2022-04-27 RX ADMIN — DOCUSATE SODIUM 100 MG: 100 CAPSULE, LIQUID FILLED ORAL at 12:35

## 2022-04-27 RX ADMIN — VALACYCLOVIR HYDROCHLORIDE 1000 MG: 500 TABLET, FILM COATED ORAL at 12:34

## 2022-04-27 NOTE — PROGRESS NOTES
Bedside shift change report given to JACY Hall and Lv Ramirez RN (oncoming nurse) by Louie Lim RN (offgoing nurse). Report included the following information SBAR.

## 2022-04-27 NOTE — ROUTINE PROCESS
04/27/2022    0515- Patient unable to void and is feeling a lot of pressure. Spoke with MICHOACANO Garcia. Ok to insert guadalupe and to reassess later in the morning.

## 2022-04-27 NOTE — LACTATION NOTE
This note was copied from a baby's chart. Infant at breast at the time of my visit. Swallows heard as infant nursing. Weight loss at 24 hours was 3.1%. Has had 2 voids and 3 stools since birth.

## 2022-04-27 NOTE — PROGRESS NOTES
Post-Operative  Day 1    Svetlana CASTANON Jorge L     Assessment: Post-Op day 1 s/p 1LTCS for primary genital HSV outbreak, stable    Plan:     - Routine post-operative care. - HSV primary outbreak- 1g valtrex ordered  - Acute urinary retention- guadalupe replaced 6am. Not associated with genital HSV pain. Will allow 12h bladder rest, then remove. To replace if continued urinary retention.   - Postop hemoglobin stable. 13.3-->10.7 Plan to start Fe at discharge if pt anemic.  - Ambulate today. Information for the patient's :  Hailey Abbasi [[id number]]   , Low Transverse     Patient doing well without significant complaint. Tolerating diet. Guadalupe replaced. Ambulating. Vitals:  Visit Vitals  BP (!) 99/50 (BP 1 Location: Right arm, BP Patient Position: At rest)   Pulse 72   Temp 98.2 °F (36.8 °C)   Resp 16   Ht 5' 4\" (1.626 m)   Wt 78 kg (172 lb)   SpO2 98%   Breastfeeding Unknown   BMI 29.52 kg/m²     Temp (24hrs), Av °F (36.7 °C), Min:97.5 °F (36.4 °C), Max:98.6 °F (37 °C)      Last 24hr Input/Output:    Intake/Output Summary (Last 24 hours) at 2022 0842  Last data filed at 2022 0545  Gross per 24 hour   Intake    Output 3895 ml   Net -3895 ml          Exam:     Patient without distress. Fundus firm, nontender per nursing fundal checks. Incision bandaged, clean, dry, intact. Shadowing left aspect, not past previously made borders              Perineum with normal lochia noted per nursing assessment. Lower extremities are negative for pathological edema.     Labs:   Lab Results   Component Value Date/Time    WBC 10.1 2022 04:25 AM    WBC 10.9 2022 08:42 AM    WBC 12.4 (H) 2022 08:37 AM    WBC 18.4 (H) 2020 01:42 AM    HGB 10.7 (L) 2022 04:25 AM    HGB 13.3 2022 08:42 AM    HGB 13.2 2022 08:37 AM    HGB 13.3 2020 01:42 AM    HCT 31.4 (L) 2022 04:25 AM    HCT 38.1 2022 08:42 AM    HCT 38.4 04/07/2022 08:37 AM    HCT 40.0 01/06/2020 01:42 AM    PLATELET 845 56/08/5576 04:25 AM    PLATELET 666 98/42/5343 08:42 AM    PLATELET 324 57/45/5581 08:37 AM    PLATELET 668 50/35/7512 01:42 AM    Hgb, External 13.6 06/04/2019 12:00 AM       Recent Results (from the past 24 hour(s))   CBC WITH AUTOMATED DIFF    Collection Time: 04/27/22  4:25 AM   Result Value Ref Range    WBC 10.1 3.6 - 11.0 K/uL    RBC 3.23 (L) 3.80 - 5.20 M/uL    HGB 10.7 (L) 11.5 - 16.0 g/dL    HCT 31.4 (L) 35.0 - 47.0 %    MCV 97.2 80.0 - 99.0 FL    MCH 33.1 26.0 - 34.0 PG    MCHC 34.1 30.0 - 36.5 g/dL    RDW 12.8 11.5 - 14.5 %    PLATELET 118 597 - 486 K/uL    MPV 11.0 8.9 - 12.9 FL    NRBC 0.0 0  WBC    ABSOLUTE NRBC 0.00 0.00 - 0.01 K/uL    NEUTROPHILS 70 32 - 75 %    LYMPHOCYTES 21 12 - 49 %    MONOCYTES 7 5 - 13 %    EOSINOPHILS 1 0 - 7 %    BASOPHILS 0 0 - 1 %    IMMATURE GRANULOCYTES 1 (H) 0.0 - 0.5 %    ABS. NEUTROPHILS 7.0 1.8 - 8.0 K/UL    ABS. LYMPHOCYTES 2.1 0.8 - 3.5 K/UL    ABS. MONOCYTES 0.7 0.0 - 1.0 K/UL    ABS. EOSINOPHILS 0.1 0.0 - 0.4 K/UL    ABS. BASOPHILS 0.0 0.0 - 0.1 K/UL    ABS. IMM.  GRANS. 0.1 (H) 0.00 - 0.04 K/UL    DF AUTOMATED

## 2022-04-27 NOTE — PROGRESS NOTES
Anesthesia Post Operative Day 1    Patient is s/p neuraxial Duramorph for  Section. The patient relates no discomfort, no itching and no nausea. There are no motor/sensation abnormalities and no complaints of a headache. Anesthesia site is normal, without erythema or tenderness. All symptoms were treated with protocol medications with good results. Patient is up and ambulating without complaints. Plan: Continue Duramorph protocol as needed. Reconsult PRN.     Clay Chacko MD  8:06 AM

## 2022-04-28 PROCEDURE — 74011250637 HC RX REV CODE- 250/637: Performed by: OBSTETRICS & GYNECOLOGY

## 2022-04-28 PROCEDURE — 65410000002 HC RM PRIVATE OB

## 2022-04-28 RX ORDER — OXYCODONE AND ACETAMINOPHEN 5; 325 MG/1; MG/1
2 TABLET ORAL
Qty: 20 TABLET | Refills: 0 | Status: SHIPPED | OUTPATIENT
Start: 2022-04-28 | End: 2022-05-01

## 2022-04-28 RX ORDER — IBUPROFEN 600 MG/1
600 TABLET ORAL
Qty: 30 TABLET | Refills: 0 | Status: SHIPPED | OUTPATIENT
Start: 2022-04-28

## 2022-04-28 RX ADMIN — IBUPROFEN 600 MG: 600 TABLET ORAL at 13:31

## 2022-04-28 RX ADMIN — IBUPROFEN 600 MG: 600 TABLET ORAL at 06:43

## 2022-04-28 RX ADMIN — VALACYCLOVIR HYDROCHLORIDE 1000 MG: 500 TABLET, FILM COATED ORAL at 19:49

## 2022-04-28 RX ADMIN — ACETAMINOPHEN 650 MG: 325 TABLET ORAL at 04:49

## 2022-04-28 RX ADMIN — IBUPROFEN 600 MG: 600 TABLET ORAL at 00:46

## 2022-04-28 RX ADMIN — ACETAMINOPHEN 650 MG: 325 TABLET ORAL at 16:08

## 2022-04-28 RX ADMIN — IBUPROFEN 600 MG: 600 TABLET ORAL at 19:49

## 2022-04-28 RX ADMIN — VALACYCLOVIR HYDROCHLORIDE 1000 MG: 500 TABLET, FILM COATED ORAL at 11:13

## 2022-04-28 NOTE — PROGRESS NOTES
Bedside and Verbal shift change report given to Louie Lim RN (oncoming nurse) by Guillermina Torres. Virginia Zelaya RN (offgoing nurse). Report included the following information SBAR.

## 2022-04-28 NOTE — ROUTINE PROCESS
0745:Bedside and Verbal shift change report given to BLAS Parish (oncoming nurse) by Diana Randolph (offgoing nurse). Report included the following information SBAR.

## 2022-04-28 NOTE — PROGRESS NOTES
Post-Operative  Day 2    Svetlana CASTANON Jorge L     Assessment: Post-Op day 2, doing well    Plan:   - Routine post-operative care. - Plan for discharge 606/706 Sidney Priest Discharge Date: Tomorrow. - HSV primary outbreak- 1g valtrex ordered  - Acute urinary retention- resolved  - Postop hemoglobin stable. 13.3-->10.7 Plan to start Fe at discharge if pt anemic. Information for the patient's :  Karan Jose [[id number]]   , Low Transverse     Patient doing well without significant complaint. Tolerating regular diet. Ambulating. Voiding without difficulty. Vitals:  Visit Vitals  BP (!) 107/56 (BP 1 Location: Right arm, BP Patient Position: At rest)   Pulse 72   Temp 97.8 °F (36.6 °C)   Resp 16   Ht 5' 4\" (1.626 m)   Wt 78 kg (172 lb)   SpO2 97%   Breastfeeding Unknown   BMI 29.52 kg/m²     Temp (24hrs), Av.1 °F (36.7 °C), Min:97.5 °F (36.4 °C), Max:98.5 °F (36.9 °C)        Exam:       Patient without distress. Fundus firm, nontender        Incision bandaged. Clean, dry, intact. Perineum with normal lochia noted per nursing assessment. Lower extremities are negative for pathological edema. Labs:   Lab Results   Component Value Date/Time    WBC 10.1 2022 04:25 AM    WBC 10.9 2022 08:42 AM    WBC 12.4 (H) 2022 08:37 AM    WBC 18.4 (H) 2020 01:42 AM    HGB 10.7 (L) 2022 04:25 AM    HGB 13.3 2022 08:42 AM    HGB 13.2 2022 08:37 AM    HGB 13.3 2020 01:42 AM    HCT 31.4 (L) 2022 04:25 AM    HCT 38.1 2022 08:42 AM    HCT 38.4 2022 08:37 AM    HCT 40.0 2020 01:42 AM    PLATELET 962  04:25 AM    PLATELET 880  08:42 AM    PLATELET 818  08:37 AM    PLATELET 215  01:42 AM    Hgb, External 13.6 2019 12:00 AM       No results found for this or any previous visit (from the past 24 hour(s)).

## 2022-04-28 NOTE — DISCHARGE INSTRUCTIONS
Patient Education      remove bandage 1 week after delivery, sooner if preferred.  Section: What to Expect at [address] [place] [address]     A  section, or , is surgery to deliver your baby through a cut that the doctor makes in your lower belly and uterus. The cut is called an incision. You may have some pain in your lower belly and need pain medicine for 1 to 2 weeks. You can expect some vaginal bleeding for several weeks. You will probably need about 6 weeks to fully recover. It's important to take it easy while the incision heals. Avoid heavy lifting, strenuous activities, and exercises that strain the belly muscles while you recover. Ask a family member or friend for help with housework, cooking, and shopping. This care sheet gives you a general idea about how long it will take for you to recover. But each person recovers at a different pace. Follow the steps below to get better as quickly as possible. How can you care for yourself at home? Activity    · Rest when you feel tired. Getting enough sleep will help you recover.     · Try to walk each day. Start by walking a little more than you did the day before. Bit by bit, increase the amount you walk. Walking boosts blood flow and helps prevent pneumonia, constipation, and blood clots.     · Avoid strenuous activities, such as bicycle riding, jogging, weightlifting, and aerobic exercise, for 6 weeks or until your doctor says it is okay.     · Until your doctor says it is okay, do not lift anything heavier than your baby.     · Do not do sit-ups or other exercises that strain the belly muscles for 6 weeks or until your doctor says it is okay.     · Hold a pillow over your incision when you cough or take deep breaths. This will support your belly and decrease your pain.     · You may shower as usual. Pat the incision dry when you are done.     · You will have some vaginal bleeding. Wear sanitary pads.  Do not douche or use tampons until your doctor says it is okay.     · Ask your doctor when you can drive again.     · You will probably need to take at least 6 weeks off work. It depends on the type of work you do and how you feel.     · Ask your doctor when it is okay for you to have sex. Diet    · You can eat your normal diet. If your stomach is upset, try bland, low-fat foods like plain rice, broiled chicken, toast, and yogurt.     · Drink plenty of fluids (unless your doctor tells you not to).     · You may notice that your bowel movements are not regular right after your surgery. This is common. Try to avoid constipation and straining with bowel movements. You may want to take a fiber supplement every day. If you have not had a bowel movement after a couple of days, ask your doctor about taking a mild laxative.     · If you are breastfeeding, limit alcohol. Alcohol can cause a lack of energy and other health problems for the baby when a breastfeeding woman drinks heavily. It can also get in the way of a mom's ability to feed her baby or to care for the child in other ways. There isn't a lot of research about exactly how much alcohol can harm a baby. Having no alcohol is the safest choice for your baby. If you choose to have a drink now and then, have only one drink, and limit the number of occasions that you have a drink. Wait to breastfeed at least 2 hours after you have a drink to reduce the amount of alcohol the baby may get in the milk. Medicines    · Your doctor will tell you if and when you can restart your medicines. You will also get instructions about taking any new medicines.     · If you take aspirin or some other blood thinner, ask your doctor if and when to start taking it again. Make sure that you understand exactly what your doctor wants you to do.     · Take pain medicines exactly as directed. ? If the doctor gave you a prescription medicine for pain, take it as prescribed.   ? If you are not taking a prescription pain medicine, ask your doctor if you can take an over-the-counter medicine.     · If you think your pain medicine is making you sick to your stomach:  ? Take your medicine after meals (unless your doctor has told you not to). ? Ask your doctor for a different pain medicine.     · If your doctor prescribed antibiotics, take them as directed. Do not stop taking them just because you feel better. You need to take the full course of antibiotics. Incision care    · If you have strips of tape on the incision, leave the tape on for a week or until it falls off.     · Wash the area daily with warm, soapy water, and pat it dry. Don't use hydrogen peroxide or alcohol, which can slow healing. You may cover the area with a gauze bandage if it weeps or rubs against clothing. Change the bandage every day.     · Keep the area clean and dry. Other instructions    · If you breastfeed your baby, you may be more comfortable while you are healing if you don't rest your baby on your belly. Try tucking your baby under your arm, with your baby's body along the side you will be feeding on. Support your baby's upper body with your arm. With that hand you can control your baby's head to bring your baby's mouth to your breast. This is sometimes called the football hold. Follow-up care is a key part of your treatment and safety. Be sure to make and go to all appointments, and call your doctor if you are having problems. It's also a good idea to know your test results and keep a list of the medicines you take. When should you call for help? Share this information with your partner, family, or a friend. They can help you watch for warning signs. Call 911  anytime you think you may need emergency care. For example, call if:    · You have thoughts of harming yourself, your baby, or another person.     · You passed out (lost consciousness).     · You have chest pain, are short of breath, or cough up blood.     · You have a seizure.    Call your doctor now or seek immediate medical care if:    · You have loose stitches, or your incision comes open.     · You have signs of hemorrhage (too much bleeding), such as:  ? Heavy vaginal bleeding. This means that you are soaking through one or more pads in an hour. Or you pass blood clots bigger than an egg. ? Feeling dizzy or lightheaded, or you feel like you may faint. ? Feeling so tired or weak that you cannot do your usual activities. ? A fast or irregular heartbeat. ? New or worse belly pain.     · You have symptoms of infection, such as:  ? Increased pain, swelling, warmth, or redness. ? Red streaks leading from the incision. ? Pus draining from the incision. ? A fever. ? Vaginal discharge that smells bad.  ? New or worse belly pain.     · You have symptoms of a blood clot in your leg (called a deep vein thrombosis), such as:  ? Pain in your calf, back of the knee, thigh, or groin. ? Redness and swelling in your leg or groin.     · You have signs of preeclampsia, such as:  ? Sudden swelling of your face, hands, or feet. ? New vision problems (such as dimness, blurring, or seeing spots). ? A severe headache. Watch closely for changes in your health, and be sure to contact your doctor if:    · Your vaginal bleeding isn't decreasing.     · You feel sad, anxious, or hopeless for more than a few days.     · You are having problems with your breasts or breastfeeding. Where can you learn more? Go to http://www.[employer].com/  Enter M806 in the search box to learn more about \" Section: What to Expect at Home. \"  Current as of: 2021               Content Version: 13.2  © 3517-0854 [employer]. Care instructions adapted under license by [employer] (acquired by [employer] in 2014) (which disclaims liability or warranty for this information).  If you have questions about a medical condition or this instruction, always ask your healthcare professional. Damien Shafer disclaims any warranty or liability for your use of this information.

## 2022-04-28 NOTE — PROGRESS NOTES
Bedside and Verbal shift change report given to Nixon Hong RN (oncoming nurse) by E. Simpson Kanner RN (offgoing nurse). Report included the following information SBAR.

## 2022-04-28 NOTE — DISCHARGE SUMMARY
Obstetrical Discharge Summary     Name: Keesha Ahuja MRN: [id number]  SSN: [id number]    YOB: 1985  Age: 31 y.o. Sex: female      Admit Date: 2022    Discharge Date: 2022    Admitting Physician: Leon Ricketts MD     Attending Physician:  Ora Mendoza MD     Admission Diagnoses: Breech presentation [O32.1XX0]    Discharge Diagnoses:   Information for the patient's :  Janeth Pérez [[id number]]   Delivery of a 3.665 kg female infant via , Low Transverse on 2022 at 10:41 AM  by Leon Ricketts. Apgars were 9  and 9 . Additional Diagnoses:   Hospital Problems  Date Reviewed: 2018          Codes Class Noted POA    Breech presentation ICD-10-CM: O32. 1XX0  ICD-9-CM: 652.20  2022 Unknown             Lab Results   Component Value Date/Time    Rubella, External immune 10/04/2021 12:00 AM    GrBStrep, External Positive 2022 12:00 AM       Hospital Course: Normal hospital course following the delivery. Patient Instructions:   Current Discharge Medication List      START taking these medications    Details   oxyCODONE-acetaminophen (PERCOCET) 5-325 mg per tablet Take 2 Tablets by mouth every four (4) hours as needed for Pain for up to 3 days. Max Daily Amount: 12 Tablets. Qty: 20 Tablet, Refills: 0    Associated Diagnoses:  delivery delivered         CONTINUE these medications which have CHANGED    Details   ibuprofen (MOTRIN) 600 mg tablet Take 1 Tablet by mouth every six (6) hours as needed for Pain. Qty: 30 Tablet, Refills: 0         CONTINUE these medications which have NOT CHANGED    Details   valACYclovir (Valtrex) 1 gram tablet Take 1,000 mg by mouth two (2) times a day. prenatal vit-calcium-iron-fa (PRENATAL PLUS WITH CALCIUM) 27 mg iron- 1 mg tab Take 1 Tab by mouth daily.          STOP taking these medications       aspirin 81 mg chewable tablet Comments:   Reason for Stopping:               Disposition at Discharge: Home or self care    Condition at Discharge: Stable    Reference my discharge instructions. Follow-up Appointments   Procedures    FOLLOW UP VISIT Appointment in: 6 Weeks     Standing Status:   Standing     Number of Occurrences:   1     Order Specific Question:   Appointment in     Answer:   6 Weeks        Signed By:  Good Solares MD     April 28, 2022                      .

## 2022-04-28 NOTE — LACTATION NOTE
This note was copied from a baby's chart. Baby nursing well today,  deep latch obtained, mother is comfortable, baby feeding vigorously with rhythmic suck, swallow, breathe pattern, audible swallowing, and evident milk transfer, both breasts offered, baby is asleep following feeding. Not seen at breast, mother declines hands on [facility] consult, expresses confidence in ability to breastfeed independently. We reviewed how to keep deep latch and discussed nipple care.

## 2022-04-29 VITALS
HEIGHT: 64 IN | OXYGEN SATURATION: 99 % | TEMPERATURE: 97.8 F | DIASTOLIC BLOOD PRESSURE: 74 MMHG | HEART RATE: 83 BPM | SYSTOLIC BLOOD PRESSURE: 130 MMHG | WEIGHT: 172 LBS | RESPIRATION RATE: 17 BRPM | BODY MASS INDEX: 29.37 KG/M2

## 2022-04-29 PROCEDURE — 2709999900 HC NON-CHARGEABLE SUPPLY

## 2022-04-29 PROCEDURE — 74011250637 HC RX REV CODE- 250/637: Performed by: OBSTETRICS & GYNECOLOGY

## 2022-04-29 RX ADMIN — VALACYCLOVIR HYDROCHLORIDE 1000 MG: 500 TABLET, FILM COATED ORAL at 08:16

## 2022-04-29 RX ADMIN — IBUPROFEN 600 MG: 600 TABLET ORAL at 08:16

## 2022-04-29 RX ADMIN — IBUPROFEN 600 MG: 600 TABLET ORAL at 03:00

## 2022-04-29 RX ADMIN — ACETAMINOPHEN 650 MG: 325 TABLET ORAL at 00:13

## 2022-04-29 NOTE — LACTATION NOTE
This note was copied from a baby's chart. Mom and baby scheduled for discharge today. I did not see the baby at the breast. Mom states baby nursing well and has improved throughout post partum stay, deep latch maintained, mother is comfortable, milk is, baby feeding vigorously with rhythmic suck, swallow, breathe pattern, with audible swallowing, and evident milk transfer, both breasts offered, baby is asleep following feeding. Baby is feeding on demand. [de-identified] bili is 4.9 in the low risk zone. Baby's weight loss is 6.4% which reflects a weight gain from yesterdays weight. We reviewed cluster feeding and engorgement. Breasts may become engorged when milk \"comes in\". How milk is made / normal phases of milk production, supply and demand discussed. Taught care of engorged breasts - frequent breastfeeding encouraged. Mom should put the baby to the breast and allow him to completely finish one breast before offering the second breast.     Pumping and returning to work/school discussed:  Start pumping for storage after first 2-3 weeks- about one hour after first AM feeding when supply is most abundant, once a day to start, timing of pumping at work/school, storage options and guidelines, and clean private pumping location (never in the bathroom). Breast feeding teaching completed and all questions answered.

## 2022-04-29 NOTE — PROGRESS NOTES
Post-Operative  Day 3    Svetlana Coats       Assessment: Post-Op day 3, doing well  Primary HSV outbreak - complete valtrex course    Plan:   - Discharge home today. - Follow up in office in 6 week(s) with [facility].  - Pain medication prescription(s) sent. - Questions answered. Information for the patient's :  Janeth Pérez [[id number]]   , Low Transverse    Patient doing well without significant complaint. Tolerating regular diet. Ambulating. Voiding without difficulty. Vitals:  Visit Vitals  /74 (BP 1 Location: Right arm, BP Patient Position: At rest)   Pulse 83   Temp 97.8 °F (36.6 °C)   Resp 17   Ht 5' 4\" (1.626 m)   Wt 78 kg (172 lb)   SpO2 99%   Breastfeeding Unknown   BMI 29.52 kg/m²     Temp (24hrs), Av °F (36.7 °C), Min:97.8 °F (36.6 °C), Max:98.3 °F (36.8 °C)        Exam:       Patient without distress. Fundus firm, nontender per nursing fundal checks. Incision bandaged. Clean, dry, intact. Perineum with normal lochia noted per nursing assessment. Lower extremities are negative for pathological edema. Labs:   Lab Results   Component Value Date/Time    WBC 10.1 2022 04:25 AM    WBC 10.9 2022 08:42 AM    WBC 12.4 (H) 2022 08:37 AM    WBC 18.4 (H) 2020 01:42 AM    HGB 10.7 (L) 2022 04:25 AM    HGB 13.3 2022 08:42 AM    HGB 13.2 2022 08:37 AM    HGB 13.3 2020 01:42 AM    HCT 31.4 (L) 2022 04:25 AM    HCT 38.1 2022 08:42 AM    HCT 38.4 2022 08:37 AM    HCT 40.0 2020 01:42 AM    PLATELET 380  04:25 AM    PLATELET 063  08:42 AM    PLATELET 338  08:37 AM    PLATELET 547  01:42 AM    Hgb, External 13.6 2019 12:00 AM       No results found for this or any previous visit (from the past 24 hour(s)).

## 2023-05-23 RX ORDER — VALACYCLOVIR HYDROCHLORIDE 1 G/1
1000 TABLET, FILM COATED ORAL 2 TIMES DAILY
COMMUNITY

## 2023-05-23 RX ORDER — IBUPROFEN 600 MG/1
600 TABLET ORAL EVERY 6 HOURS PRN
COMMUNITY
Start: 2022-04-28

## 2025-08-09 ENCOUNTER — HOSPITAL ENCOUNTER (EMERGENCY)
Facility: HOSPITAL | Age: 40
Discharge: HOME | End: 2025-08-09
Attending: EMERGENCY MEDICINE
Payer: COMMERCIAL

## 2025-08-09 ENCOUNTER — APPOINTMENT (OUTPATIENT)
Facility: HOSPITAL | Age: 40
End: 2025-08-09
Payer: COMMERCIAL

## 2025-08-09 VITALS
SYSTOLIC BLOOD PRESSURE: 110 MMHG | OXYGEN SATURATION: 100 % | HEART RATE: 77 BPM | WEIGHT: 174.38 LBS | BODY MASS INDEX: 29.93 KG/M2 | RESPIRATION RATE: 18 BRPM | DIASTOLIC BLOOD PRESSURE: 77 MMHG | TEMPERATURE: 97.5 F

## 2025-08-09 DIAGNOSIS — I82.812 SUPERFICIAL THROMBOSIS OF LEFT LOWER EXTREMITY: Primary | ICD-10-CM

## 2025-08-09 PROCEDURE — 93971 EXTREMITY STUDY: CPT

## 2025-08-09 PROCEDURE — 99284 EMERGENCY DEPT VISIT MOD MDM: CPT

## 2025-08-09 ASSESSMENT — PAIN SCALES - GENERAL: PAINLEVEL_OUTOF10: 0

## (undated) DEVICE — DRESSING SIL W4XL5IN ANTIBACT GELLING FBR CYTOFORM

## (undated) DEVICE — COVERALL PREM SMS 2XL KNIT --

## (undated) DEVICE — SOLUTION IRRIG 1000ML STRL H2O USP PLAS POUR BTL

## (undated) DEVICE — SUTURE MCRYL SZ 0 L36IN ABSRB UD L36MM CT-1 1/2 CIR Y946H

## (undated) DEVICE — 3000CC GUARDIAN II: Brand: GUARDIAN

## (undated) DEVICE — SUTURE VCRL SZ 2-0 L36IN ABSRB VLT L36MM CT-1 1/2 CIR J345H

## (undated) DEVICE — ANESTHESIA TRAY SPNL W/O NDL PENCAN

## (undated) DEVICE — PACK PROCEDURE SURG C SECT KT SMH

## (undated) DEVICE — SOLUTION IRRIG 1000ML 0.9% SOD CHL USP POUR PLAS BTL

## (undated) DEVICE — MEDI-VAC NON-CONDUCTIVE SUCTION TUBING: Brand: CARDINAL HEALTH

## (undated) DEVICE — STERILE POLYISOPRENE POWDER-FREE SURGICAL GLOVES: Brand: PROTEXIS

## (undated) DEVICE — SUTURE VCRL SZ 1 L36IN ABSRB VLT L36MM CT-1 1/2 CIR J347H

## (undated) DEVICE — SUTURE MCRYL SZ 4-0 L27IN ABSRB UD L24MM PS-1 3/8 CIR PRIM Y935H

## (undated) DEVICE — KENDALL SCD EXPRESS SLEEVES, KNEE LENGTH, MEDIUM: Brand: KENDALL SCD

## (undated) DEVICE — DEVON™ KNEE AND BODY STRAP 60" X 3" (1.5 M X 7.6 CM): Brand: DEVON

## (undated) DEVICE — ROYALSILK SURGICAL GOWN, L: Brand: CONVERTORS

## (undated) DEVICE — REM POLYHESIVE ADULT PATIENT RETURN ELECTRODE: Brand: VALLEYLAB

## (undated) DEVICE — DRAPE FLD WRM W44XL66IN C6L FOR INTRATEMP SYS THERMABASIN

## (undated) DEVICE — LIGHT HANDLE: Brand: DEVON

## (undated) DEVICE — CATH FOLEY 16F LUBRI-SIL IC --

## (undated) DEVICE — PREP SKN CHLRAPRP APL 26ML STR --

## (undated) DEVICE — ATTACHMENT SMK 3/8INX10FT VALLEYLAB